# Patient Record
Sex: FEMALE | Race: BLACK OR AFRICAN AMERICAN | URBAN - METROPOLITAN AREA
[De-identification: names, ages, dates, MRNs, and addresses within clinical notes are randomized per-mention and may not be internally consistent; named-entity substitution may affect disease eponyms.]

---

## 2018-02-20 ENCOUNTER — HISTORICAL (OUTPATIENT)
Dept: ADMINISTRATIVE | Facility: HOSPITAL | Age: 68
End: 2018-02-20

## 2020-12-12 ENCOUNTER — HISTORICAL (OUTPATIENT)
Dept: ADMINISTRATIVE | Facility: HOSPITAL | Age: 70
End: 2020-12-12

## 2020-12-12 LAB — SARS-COV-2 RNA RESP QL NAA+PROBE: NOT DETECTED

## 2022-04-10 ENCOUNTER — HISTORICAL (OUTPATIENT)
Dept: ADMINISTRATIVE | Facility: HOSPITAL | Age: 72
End: 2022-04-10

## 2022-04-11 ENCOUNTER — HISTORICAL (OUTPATIENT)
Dept: ADMINISTRATIVE | Facility: HOSPITAL | Age: 72
End: 2022-04-11

## 2022-04-28 VITALS
WEIGHT: 142 LBS | DIASTOLIC BLOOD PRESSURE: 68 MMHG | HEIGHT: 61 IN | BODY MASS INDEX: 26.81 KG/M2 | SYSTOLIC BLOOD PRESSURE: 124 MMHG

## 2022-04-28 VITALS
DIASTOLIC BLOOD PRESSURE: 68 MMHG | BODY MASS INDEX: 26.81 KG/M2 | WEIGHT: 142 LBS | SYSTOLIC BLOOD PRESSURE: 124 MMHG | HEIGHT: 61 IN

## 2024-08-28 DIAGNOSIS — M54.42 ACUTE BACK PAIN WITH SCIATICA, LEFT: Primary | ICD-10-CM

## 2024-08-28 DIAGNOSIS — M54.41 ACUTE BACK PAIN WITH SCIATICA, RIGHT: ICD-10-CM

## 2024-09-17 ENCOUNTER — OFFICE VISIT (OUTPATIENT)
Facility: CLINIC | Age: 74
End: 2024-09-17
Payer: MEDICARE

## 2024-09-17 VITALS
DIASTOLIC BLOOD PRESSURE: 75 MMHG | WEIGHT: 142 LBS | BODY MASS INDEX: 26.81 KG/M2 | SYSTOLIC BLOOD PRESSURE: 145 MMHG | HEART RATE: 81 BPM | HEIGHT: 61 IN

## 2024-09-17 DIAGNOSIS — M54.42 BILATERAL LOW BACK PAIN WITH BILATERAL SCIATICA, UNSPECIFIED CHRONICITY: Primary | ICD-10-CM

## 2024-09-17 DIAGNOSIS — M47.816 LUMBAR SPONDYLOSIS: ICD-10-CM

## 2024-09-17 DIAGNOSIS — M54.42 ACUTE BACK PAIN WITH SCIATICA, LEFT: ICD-10-CM

## 2024-09-17 DIAGNOSIS — M54.41 BILATERAL LOW BACK PAIN WITH BILATERAL SCIATICA, UNSPECIFIED CHRONICITY: Primary | ICD-10-CM

## 2024-09-17 DIAGNOSIS — M54.41 ACUTE BACK PAIN WITH SCIATICA, RIGHT: ICD-10-CM

## 2024-09-17 PROCEDURE — 3077F SYST BP >= 140 MM HG: CPT | Mod: CPTII,,, | Performed by: NURSE PRACTITIONER

## 2024-09-17 PROCEDURE — 1159F MED LIST DOCD IN RCRD: CPT | Mod: CPTII,,, | Performed by: NURSE PRACTITIONER

## 2024-09-17 PROCEDURE — 99204 OFFICE O/P NEW MOD 45 MIN: CPT | Mod: 25,,, | Performed by: NURSE PRACTITIONER

## 2024-09-17 PROCEDURE — 1160F RVW MEDS BY RX/DR IN RCRD: CPT | Mod: CPTII,,, | Performed by: NURSE PRACTITIONER

## 2024-09-17 PROCEDURE — 3078F DIAST BP <80 MM HG: CPT | Mod: CPTII,,, | Performed by: NURSE PRACTITIONER

## 2024-09-17 PROCEDURE — 1101F PT FALLS ASSESS-DOCD LE1/YR: CPT | Mod: CPTII,,, | Performed by: NURSE PRACTITIONER

## 2024-09-17 PROCEDURE — 3008F BODY MASS INDEX DOCD: CPT | Mod: CPTII,,, | Performed by: NURSE PRACTITIONER

## 2024-09-17 PROCEDURE — 96372 THER/PROPH/DIAG INJ SC/IM: CPT | Mod: ,,, | Performed by: NURSE PRACTITIONER

## 2024-09-17 PROCEDURE — 1125F AMNT PAIN NOTED PAIN PRSNT: CPT | Mod: CPTII,,, | Performed by: NURSE PRACTITIONER

## 2024-09-17 PROCEDURE — 3288F FALL RISK ASSESSMENT DOCD: CPT | Mod: CPTII,,, | Performed by: NURSE PRACTITIONER

## 2024-09-17 RX ORDER — FOLIC ACID 1 MG/1
1000 TABLET ORAL
COMMUNITY
Start: 2024-09-16

## 2024-09-17 RX ORDER — FLUTICASONE PROPIONATE AND SALMETEROL 100; 50 UG/1; UG/1
1 POWDER RESPIRATORY (INHALATION)
COMMUNITY
Start: 2024-04-09 | End: 2024-10-06

## 2024-09-17 RX ORDER — ALENDRONATE SODIUM 70 MG/1
70 TABLET ORAL
COMMUNITY

## 2024-09-17 RX ORDER — BUSPIRONE HYDROCHLORIDE 5 MG/1
5 TABLET ORAL
COMMUNITY
Start: 2024-01-22

## 2024-09-17 RX ORDER — CALCIUM CARBONATE/VITAMIN D3 600MG-5MCG
TABLET ORAL
COMMUNITY

## 2024-09-17 RX ORDER — BIMATOPROST 0.1 MG/ML
SOLUTION/ DROPS OPHTHALMIC
COMMUNITY
Start: 2024-08-13

## 2024-09-17 RX ORDER — SIMVASTATIN 40 MG/1
1 TABLET, FILM COATED ORAL NIGHTLY
COMMUNITY
Start: 2024-07-15

## 2024-09-17 RX ORDER — LETROZOLE 2.5 MG/1
2.5 TABLET, FILM COATED ORAL
COMMUNITY

## 2024-09-17 RX ORDER — ALBUTEROL SULFATE 90 UG/1
2 INHALANT RESPIRATORY (INHALATION)
COMMUNITY
Start: 2023-11-29

## 2024-09-17 RX ORDER — METHOTREXATE 2.5 MG/1
10 TABLET ORAL
COMMUNITY
Start: 2024-09-16

## 2024-09-17 RX ORDER — TIZANIDINE 4 MG/1
4 TABLET ORAL NIGHTLY
COMMUNITY
Start: 2024-09-16

## 2024-09-17 RX ORDER — CELECOXIB 200 MG/1
200 CAPSULE ORAL
COMMUNITY
Start: 2024-09-16

## 2024-09-17 RX ORDER — NAPROXEN SODIUM 220 MG/1
1 TABLET, FILM COATED ORAL EVERY MORNING
COMMUNITY

## 2024-09-17 RX ORDER — FLUTICASONE PROPIONATE 50 MCG
1 SPRAY, SUSPENSION (ML) NASAL DAILY PRN
COMMUNITY

## 2024-09-17 RX ORDER — OMEPRAZOLE 40 MG/1
40 CAPSULE, DELAYED RELEASE ORAL
COMMUNITY
Start: 2024-09-16

## 2024-09-17 RX ORDER — IBUPROFEN 100 MG/5ML
1 SUSPENSION, ORAL (FINAL DOSE FORM) ORAL EVERY MORNING
COMMUNITY

## 2024-09-17 RX ORDER — MONTELUKAST SODIUM 10 MG/1
1 TABLET ORAL NIGHTLY
COMMUNITY
Start: 2024-06-27 | End: 2025-06-27

## 2024-09-17 RX ORDER — METHYLPREDNISOLONE ACETATE 80 MG/ML
40 INJECTION, SUSPENSION INTRA-ARTICULAR; INTRALESIONAL; INTRAMUSCULAR; SOFT TISSUE
Status: COMPLETED | OUTPATIENT
Start: 2024-09-17 | End: 2024-09-17

## 2024-09-17 RX ADMIN — METHYLPREDNISOLONE ACETATE 40 MG: 80 INJECTION, SUSPENSION INTRA-ARTICULAR; INTRALESIONAL; INTRAMUSCULAR; SOFT TISSUE at 09:09

## 2024-09-17 NOTE — PROGRESS NOTES
[facility]    Subjective:     Chief Complaint: No chief complaint on file.    Referred by: Daniel Lazcano*     History of Present Illness: Corrine French is a 73 y.o. female presents today as a new consult for bilateral low back pain with bilateral sciatica.  Patient's pain started April of this year after twisting her back however it has been ongoing for years spontaneously.  Her primary pain is located to the lumbar axial spine along with bilateral hips and will radiate in a posterior lateral pattern down bilateral legs.  She reports her leg pain is her primary issue and back pain as a secondary.  She completed 6 weeks of physical therapy both in the gym in the pull that was ineffective for her pain.  In 2008 she received 1 lumbar epidural steroid injection that gave her excellent pain relief.  Her pain will elevate to a 10/10 on the NRS especially at night she has a lot of difficulty turning in bed with severe pain as well as egress sitting out of bed to go to the bathroom at night.  Nighttime is her worst time for pain. Her pain will reduce when she starts moving around and walking.  This will reduced to a 5/10 on the NRS.  In the past she has tried gabapentin for 3 months as well as Mobic that was ineffective.  She is currently taking Celebrex and tizanidine.  She is also in the process of establishing care with the endocrinologist who will be managing her methotrexate.  She has a history breast cancer that was in Situ with a lumpectomy.  She describes her pain to the legs as feeling like a crushing and like her bones are being broken type pain.  In the past she had numbness she no longer has that.  She has not completed a NCV of her bilateral lower extremities.  In his interested in doing so.  Review of her MRI lumbar spine has minimal changes at L5-S1 in her MRI lumbar spine which was completed in 2023.  She is agreeable to complete a bilateral lower extremity nerve conduction  velocity to better define the etiology her sciatica to bilateral posterior legs stopping at her knees.    Pertinent PMH rheumatoid arthritis with new methotrexate, osteoporosis without fractures  Pertinent PSH: Negative spine surgeries, negative pacemaker/defibrillator/spinal cord stimulator        Vital signs:   There were no vitals taken for this visit. There were no vitals filed for this visit.          Interventional Pain History  Prior lumbar ESIs in 2008 (very effective)     ROS:  Back and bilateral leg pain    MRI lumbar spine 2023  (located in care everywhere)   Impression    1. Disc space narrowing, mild annular bulging L1-L2.  2. Annular bulging L3-L4.  3. Disc space narrowing with uncovering of the disc and borderline canal stenosis L4-L5. Alignment deformity is present at this level.  4. Multilevel posterior element hypertrophy.  Narrative    HISTORY/TECHNIQUE: Multiplanar, multiecho scanning of the lumbar spine is completed. No contrast was administered. No prior scans are available. History of low back pain and suspected spondyloarthropathy.    FINDINGS: Conus medullaris tapers at the T12-L1 level. Multilevel lumbar disc desiccation is demonstrated. Lumbar vertebral body stature is maintained. There is alignment deformity at the L4-5 level with anterolisthesis of L4 upon L5 with pars interarticularis defects of L4 suspected.    L1-L2: Disc space narrowing. Schmorl's nodes are identified at the contiguous endplates at this level. Mild annular bulging. Spinal canal and neural foramina are patent.    L2-L3: Spinal canal and neural foramina are patent. Mild annular bulging. There is posterior element hypertrophy.    L3-L4: Annular bulging. Spinal canal and neural foramen are patent. There is posterior element hypertrophy.    L4-L5: Disc space narrowing. Uncovering of the disc at this level. There is effacement of the spinal canal with borderline spinal canal stenosis. There is effacement of the right  greater than left subarticular recess. Neural foramina are patent.    L5-S1: Spinal canal and left neural foramen appear patent. There is effacement of the right subarticular recess and right neural foramen secondary to posterior element hypertrophy asymmetric toward the right.  Procedure Note    Darrius Francis MD - 04/18/2023  Formatting of this note might be different from the original.  HISTORY/TECHNIQUE: Multiplanar, multiecho scanning of the lumbar spine is completed. No contrast was administered. No prior scans are available. History of low back pain and suspected spondyloarthropathy.    FINDINGS: Conus medullaris tapers at the T12-L1 level. Multilevel lumbar disc desiccation is demonstrated. Lumbar vertebral body stature is maintained. There is alignment deformity at the L4-5 level with anterolisthesis of L4 upon L5 with pars interarticularis defects of L4 suspected.    L1-L2: Disc space narrowing. Schmorl's nodes are identified at the contiguous endplates at this level. Mild annular bulging. Spinal canal and neural foramina are patent.    L2-L3: Spinal canal and neural foramina are patent. Mild annular bulging. There is posterior element hypertrophy.    L3-L4: Annular bulging. Spinal canal and neural foramen are patent. There is posterior element hypertrophy.    L4-L5: Disc space narrowing. Uncovering of the disc at this level. There is effacement of the spinal canal with borderline spinal canal stenosis. There is effacement of the right greater than left subarticular recess. Neural foramina are patent.    L5-S1: Spinal canal and left neural foramen appear patent. There is effacement of the right subarticular recess and right neural foramen secondary to posterior element hypertrophy asymmetric toward the right.    IMPRESSION:  1. Disc space narrowing, mild annular bulging L1-L2.  2. Annular bulging L3-L4.  3. Disc space narrowing with uncovering of the disc and borderline canal stenosis L4-L5. Alignment  deformity is present at this level.  4. Multilevel posterior element hypertrophy.       Objective:        Physical Exam  General: Well developed;gt A&O x 3; No anxiety/depression; NAD  Mental Status: Oriented to person, palce and time. Displays appropriate mood & affect.  Head: Norm cephalic and atraumatic  Neck:  No cervical paraspinal banding.  Full range of motion with lateral turning and cervical flexion +extension.  Eyes: normal conjunctiva, normal lids, normal pupils  ENT and mouth: normal external ear, nose, and no lesions noted on the lips.  Respiratory: Symmetrical, Unlabored. No dyspnea  CV: normal rhythm and rate. No peripheral edema.   Abdomen: Non-distended    Extremities:  Gen: No cyanosis or tenderness to palpation bilateral upper and lower extremities  Skin: Warm, pink, dry, no rashes, no lesions on the lumbar spine  Strength: 5/5 motor strength bilateral upper and lower extremities  ROM: Full ROM in bilateral knees and ankles without pain or instability.    Neuro:  Gait: no altalgic lean, normal toe and heel raise. Independent ambulator.  DTR's: 2+ in bilateral patellar, and ankle  Sensory: Intact to light touch bilateral  upper and lower extremities    Spine: Normal lordosis. No scoliosis  L-spine ROM:  Limited and slight pain with ROM to flexion, extension, bilateral rotation,   Straight Leg Raise:  Positive right, positive left  SI Joint: No tenderness to palpation bilaterally.      Assessment:     Corrine French is a 73 y.o. female presents today as a new consult for bilateral low back pain with bilateral sciatica.  Patient's pain started April of this year after twisting her back however it has been ongoing for years spontaneously.  Her primary pain is located to the lumbar axial spine along with bilateral hips and will radiate in a posterior lateral pattern down bilateral legs.  She reports her leg pain is her primary issue and back pain as a secondary.  She completed 6 weeks of physical  therapy both in the gym in the pull that was ineffective for her pain.  In 2008 she received 1 lumbar epidural steroid injection that gave her excellent pain relief.  Her pain will elevate to a 10/10 on the NRS especially at night she has a lot of difficulty turning in bed with severe pain as well as egress sitting out of bed to go to the bathroom at night.  Nighttime is her worst time for pain. Her pain will reduce when she starts moving around and walking.  This will reduced to a 5/10 on the NRS.  In the past she has tried gabapentin for 3 months as well as Mobic that was ineffective.  She is currently taking Celebrex and tizanidine.  She is also in the process of establishing care with the endocrinologist who will be managing her methotrexate.  She has a history breast cancer that was in Situ with a lumpectomy.  She describes her pain to the legs as feeling like a crushing and like her bones are being broken type pain.  In the past she had numbness she no longer has that.  She has not completed a NCV of her bilateral lower extremities.  In his interested in doing so.  Review of her MRI lumbar spine has minimal changes at L5-S1 in her MRI lumbar spine which was completed in 2023.  She is agreeable to complete a bilateral lower extremity nerve conduction velocity to better define the etiology her sciatica to bilateral posterior legs stopping at her knees.      Plan:   NCV bilateral lower ext  Follow up in 3 weeks for results  40 mg depomedrol IM    Encounter Diagnosis   Name Primary?    Bilateral low back pain with bilateral sciatica, unspecified chronicity Yes         Plan:       Diagnoses and all orders for this visit:    Bilateral low back pain with bilateral sciatica, unspecified chronicity           No past medical history on file.    No past surgical history on file.    No family history on file.    Social History     Socioeconomic History    Marital status:        No current outpatient medications on  file.     No current facility-administered medications for this visit.       Review of patient's allergies indicates:  Not on File

## 2024-10-10 ENCOUNTER — OFFICE VISIT (OUTPATIENT)
Facility: CLINIC | Age: 74
End: 2024-10-10
Payer: MEDICARE

## 2024-10-10 VITALS
TEMPERATURE: 98 F | HEART RATE: 81 BPM | DIASTOLIC BLOOD PRESSURE: 74 MMHG | SYSTOLIC BLOOD PRESSURE: 133 MMHG | BODY MASS INDEX: 26.81 KG/M2 | HEIGHT: 61 IN | WEIGHT: 142 LBS

## 2024-10-10 DIAGNOSIS — M54.16 LUMBAR RADICULOPATHY: ICD-10-CM

## 2024-10-10 DIAGNOSIS — G89.29 CHRONIC BILATERAL LOW BACK PAIN WITH BILATERAL SCIATICA: Primary | ICD-10-CM

## 2024-10-10 DIAGNOSIS — M54.42 CHRONIC BILATERAL LOW BACK PAIN WITH BILATERAL SCIATICA: Primary | ICD-10-CM

## 2024-10-10 DIAGNOSIS — M51.369 DEGENERATION OF INTERVERTEBRAL DISC OF LUMBAR REGION, UNSPECIFIED WHETHER PAIN PRESENT: ICD-10-CM

## 2024-10-10 DIAGNOSIS — M47.816 LUMBAR SPONDYLOSIS: ICD-10-CM

## 2024-10-10 DIAGNOSIS — M54.41 CHRONIC BILATERAL LOW BACK PAIN WITH BILATERAL SCIATICA: Primary | ICD-10-CM

## 2024-10-10 NOTE — PROGRESS NOTES
"  [facility]    Subjective:     Chief Complaint: Low-back Pain (3 week f/u EMG results today, pt states she has been having difficult time sleeping due to bilateral leg pain, RX medication for relief, pain level 8/10 //Depo medrol did not provide relief )    Referred by: No ref. provider found     History of Present Illness: Corrine French is a 73 y.o. female presents today as a three-week follow-up after completing bilateral EMG/NCV to go over results and plan of care.  Results showed chronic bilateral L3 +L4 radiculopathy without denervation.  Patient continues to have pain to bilateral low back, buttocks and bilateral hips as well as pain down bilateral posterior thighs.  She completed 6 weeks of physical therapy with exacerbated pain as a result.  She currently takes tizanidine as needed as well as Celebrex and she will start methotrexate soon.  Patient states her pain will elevate in these areas to a 7-8/10 on the NRS with cleaning, sweeping mopping and other household chores.  At night when she is turning and tossing in bed defined a non painful position this will elevate her pain score to a 10/10.  Additionally if she rides in a golf cart this will elevate her pain to a 10/10 as well.  She describes this as a sharp stabbing sensation.  She would like to proceed with a bilateral transforaminal epidural steroid injection to L3.        Vital signs:   Visit Vitals  /74   Pulse 81   Temp 98.4 °F (36.9 °C) (Oral)   Ht 5' 1" (1.549 m)   Wt 64.4 kg (141 lb 15.6 oz)   BMI 26.83 kg/m²      Vitals:    10/10/24 1510   PainSc:   8     Pain Disability Index (PDI): 43       Interventional Pain History  None    ROS: Low back and leg pain    EMG/NCV results 2024   (lower extremities):    Results showed chronic bilateral L3 +L4 radiculopathy without denervation.    MRI lumbar spine 2023:  Impression  1. Disc space narrowing, mild annular bulging L1-L2.  2. Annular bulging L3-L4.  3. Disc space narrowing " with uncovering of the disc and borderline canal stenosis L4-L5. Alignment deformity is present at this level.  4. Multilevel posterior element hypertrophy.    FINDINGS: Conus medullaris tapers at the T12-L1 level. Multilevel lumbar disc desiccation is demonstrated. Lumbar vertebral body stature is maintained. There is alignment deformity at the L4-5 level with anterolisthesis of L4 upon L5 with pars interarticularis defects of L4 suspected.    L1-L2: Disc space narrowing. Schmorl's nodes are identified at the contiguous endplates at this level. Mild annular bulging. Spinal canal and neural foramina are patent.    L2-L3: Spinal canal and neural foramina are patent. Mild annular bulging. There is posterior element hypertrophy.    L3-L4: Annular bulging. Spinal canal and neural foramen are patent. There is posterior element hypertrophy.    L4-L5: Disc space narrowing. Uncovering of the disc at this level. There is effacement of the spinal canal with borderline spinal canal stenosis. There is effacement of the right greater than left subarticular recess. Neural foramina are patent.    L5-S1: Spinal canal and left neural foramen appear patent. There is effacement of the right subarticular recess and right neural foramen secondary to posterior element hypertrophy asymmetric toward the right.  Exam End: 04/18/23 09:00 Last Resulted: 04/18/23 09:22   [facility]  Result Received: 09/05/24 14:19      Narrative            Objective:        Physical Exam  General: Well developed; normal weight A&O x 3; No anxiety/depression; NAD  Mental Status: Oriented to person, palce and time. Displays appropriate mood & affect.  Head: Norm cephalic and atraumatic  Neck:  No cervical paraspinal banding.  Full range of motion with lateral turning and cervical flexion +extension.  Eyes: normal conjunctiva, normal lids, normal pupils  ENT and mouth:  normal external ear, nose, and no lesions noted on the lips.  Respiratory: Symmetrical, Unlabored. No dyspnea  CV: normal rhythm and rate. No peripheral edema.   Abdomen: Non-distended    Extremities:  Gen: No cyanosis or tenderness to palpation bilateral upper and lower extremities  Skin: Warm, pink, dry, no rashes, no lesions on the lumbar spine  Strength: 5/5 motor strength bilateral upper and lower extremities  ROM: Full ROM in bilateral knees and ankles without pain or instability.    Neuro:  Gait: no altalgic lean, normal toe and heel raise. Independent ambulator.  DTR's: 2+ in bilateral patellar, and ankle  Sensory: Intact to light touch bilateral  upper and lower extremities    Spine: Normal lordosis. No scoliosis  L-spine ROM:  Limited and painful ROM to flexion, extension, bilateral rotation,   Straight Leg Raise:  Positive right, positive left  SI Joint: No tenderness to palpation bilaterally.      Assessment:     Corrine French is a 73 y.o. female presents today as a three-week follow-up after completing bilateral EMG/NCV to go over results and plan of care.  Results showed chronic bilateral L3 +L4 radiculopathy without denervation.  Patient continues to have pain to bilateral low back, buttocks and bilateral hips as well as pain down bilateral posterior thighs.  She completed 6 weeks of physical therapy with exacerbated pain as a result.  She currently takes tizanidine as needed as well as Celebrex and she will start methotrexate soon.  Patient states her pain will elevate in these areas to a 7-8/10 on the NRS with cleaning, sweeping mopping and other household chores.  At night when she is turning and tossing in bed defined a non painful position this will elevate her pain score to a 10/10.  Additionally if she rides in a golf cart this will elevate her pain to a 10/10 as well.  She describes this as a sharp stabbing sensation.  She would like to proceed with a bilateral transforaminal epidural  "steroid injection to L3.    Plan of Care:   Request sent for bilateral [id number]  Patient to hold Methotrexate 7 days prior to procedure.   Encounter Diagnoses   Name Primary?    Chronic bilateral low back pain with bilateral sciatica Yes    Lumbar radiculopathy          Plan:       Corrine Mohr" was seen today for low-back pain.    Diagnoses and all orders for this visit:    Chronic bilateral low back pain with bilateral sciatica    Lumbar radiculopathy           Past Medical History:   Diagnosis Date    Allergy     Breast cancer in female     Rt breast    Hyperlipidemia        Past Surgical History:   Procedure Laterality Date    CHOLECYSTECTOMY      ENDOSCOPIC RELEASE OF BOTH CARPAL TUNNELS Bilateral     HEMORRHOID SURGERY N/A     HYSTERECTOMY      LUMPECTOMY, BREAST Right        No family history on file.    Social History     Socioeconomic History    Marital status:    Tobacco Use    Smoking status: Never    Smokeless tobacco: Never   Substance and Sexual Activity    Alcohol use: Yes    Drug use: Never       Current Outpatient Medications   Medication Sig Dispense Refill    albuterol (PROVENTIL/VENTOLIN HFA) 90 mcg/actuation inhaler Inhale 2 puffs into the lungs.      alendronate (FOSAMAX) 70 MG tablet Take 70 mg by mouth every 7 days.      ascorbic acid, vitamin C, (VITAMIN C) 1000 MG tablet Take 1 tablet by mouth every morning.      aspirin 81 MG Chew Take 1 tablet by mouth every morning.      busPIRone (BUSPAR) 5 MG Tab Take 5 mg by mouth.      calcium-vitamin D tablet 600 mg-200 units 1 tablet with a meal Orally twice a day      celecoxib (CELEBREX) 200 MG capsule Take 200 mg by mouth.      fluticasone propionate (FLONASE) 50 mcg/actuation nasal spray 1 spray by Nasal route daily as needed.      folic acid (FOLVITE) 1 MG tablet Take 1,000 mcg by mouth.      letrozole (FEMARA) 2.5 mg Tab Take 2.5 mg by mouth.      LUMIGAN 0.01 % Drop Place into both eyes.      methotrexate 2.5 MG Tab Take 10 mg by mouth " every 7 days.      montelukast (SINGULAIR) 10 mg tablet Take 1 tablet by mouth every evening.      omeprazole (PRILOSEC) 40 MG capsule Take 40 mg by mouth.      simvastatin (ZOCOR) 40 MG tablet Take 1 tablet by mouth every evening.      tiZANidine (ZANAFLEX) 4 MG tablet Take 4 mg by mouth every evening.      fluticasone-salmeterol diskus inhaler 100-50 mcg Inhale 1 puff into the lungs.       No current facility-administered medications for this visit.       Review of patient's allergies indicates:   Allergen Reactions    Codeine Itching, Rash and Other (See Comments)     Other Reaction(s): C/O: itching    Hydrocodone Itching and Rash

## 2024-10-31 ENCOUNTER — OFFICE VISIT (OUTPATIENT)
Facility: CLINIC | Age: 74
End: 2024-10-31
Payer: MEDICARE

## 2024-10-31 VITALS
HEART RATE: 81 BPM | HEIGHT: 61 IN | DIASTOLIC BLOOD PRESSURE: 76 MMHG | WEIGHT: 141 LBS | SYSTOLIC BLOOD PRESSURE: 139 MMHG | BODY MASS INDEX: 26.62 KG/M2 | TEMPERATURE: 98 F

## 2024-10-31 DIAGNOSIS — M54.42 CHRONIC BILATERAL LOW BACK PAIN WITH BILATERAL SCIATICA: ICD-10-CM

## 2024-10-31 DIAGNOSIS — M51.362 DEGENERATION OF INTERVERTEBRAL DISC OF LUMBAR REGION WITH DISCOGENIC BACK PAIN AND LOWER EXTREMITY PAIN: ICD-10-CM

## 2024-10-31 DIAGNOSIS — G89.29 CHRONIC BILATERAL LOW BACK PAIN WITH BILATERAL SCIATICA: ICD-10-CM

## 2024-10-31 DIAGNOSIS — M54.41 CHRONIC BILATERAL LOW BACK PAIN WITH BILATERAL SCIATICA: ICD-10-CM

## 2024-10-31 DIAGNOSIS — M54.16 LUMBAR RADICULOPATHY: Primary | ICD-10-CM

## 2024-10-31 NOTE — H&P (VIEW-ONLY)
Pain Management Clinic  Pre-Operative Clinic Note      SUBJECTIVE    CHIEF COMPLAINT: Pre-op Exam (Pre op procedure 11/14/24 C/O pain level 5, Taking Celebrex & Tizanidine for pain, pain worse @ night states pain scale 10 +)       History of Present Illness: 73 y.o. female presents today for preoperative evaluation for bilateral L3 transforaminal epidural steroid injection on 11/14/2024.. I reviewed the indications for procedure. The risks and benefits of the proposed and alternative treatments were discussed with the patient. Questions pertinent to the procedure were solicited and answered. No assurances were given. Informed consent was obtained. The patient expressed good understanding and wished to proceed with scheduling the procedure.     Review of Systems:   Constitutional: No fever, weakness, or fatigue.   Ear/Nose/Mouth/Throat: No nasal congestion or sore throat.   Respiratory: No shortness of breath or cough.   Cardiovascular: No chest pain, palpitations, or peripheral edema.   Gastrointestinal: No nausea, vomiting, or abdominal pain.   Genitourinary: No dysuria.  Musculoskeletal: Patient continues to have pain to bilateral low back, buttocks and bilateral hips as well as pain down bilateral posterior thighs. She completed 6 weeks of physical therapy with exacerbated pain as a result. She currently takes tizanidine as needed as well as Celebrex and she will start methotrexate soon. Patient states her pain will elevate in these areas to a 7-8/10 on the NRS with cleaning, sweeping mopping and other household chores. At night when she is turning and tossing in bed defined a non painful position this will elevate her pain score to a 10/10. Additionally if she rides in a golf cart this will elevate her pain to a 10/10 as well. She describes this as a sharp stabbing sensation. She would like to proceed with a bilateral transforaminal epidural steroid injection to L3.     Past Surgical History:   Procedure  "Laterality Date    CHOLECYSTECTOMY      ENDOSCOPIC RELEASE OF BOTH CARPAL TUNNELS Bilateral     HEMORRHOID SURGERY N/A     HYSTERECTOMY      LUMPECTOMY, BREAST Right         Past Medical History:   Diagnosis Date    Allergy     Breast cancer in female     Rt breast    Hyperlipidemia         OBJECTIVE:    Visit Vitals  /76 (BP Location: Right arm, Patient Position: Sitting)   Pulse 81   Temp 98.1 °F (36.7 °C)   Ht 5' 1" (1.549 m)   Wt 64 kg (141 lb)   BMI 26.64 kg/m²     Vitals:    10/31/24 1102   PainSc:   5       Physical Exam:   General: Well-developed, well-nourished.  Neuro: Alert and oriented x 3.  Psych: Normal mood and affect.  HEENT: Normocephalic. PERRLA EOM intact. Nose and throat clear.  Lungs: Clear to auscultation and percussion.  Heart: Regular rate and rhythm   Abdomen: Soft non-tender. Bowel sounds positive. No rebound tenderness.  Skin: No rashes or open wounds  Musculoskeletal:Neuro:  Gait: no altalgic lean, normal toe and heel raise. Independent ambulator.  DTR's: 2+ in bilateral patellar, and ankle  Sensory: Intact to light touch bilateral  upper and lower extremities     Spine: Normal lordosis. No scoliosis  L-spine ROM:  Limited and painful ROM to flexion, extension, bilateral rotation,   Straight Leg Raise:  Positive right, positive left  SI Joint: No tenderness to palpation bilaterally.       ASSESSMENT:  1. Lumbar radiculopathy    2. Degeneration of intervertebral disc of lumbar region with discogenic back pain and lower extremity pain    3. Chronic bilateral low back pain with bilateral sciatica       PLAN:  Plan for to proceed with bilateral L3 transforaminal epidural steroid injection on 11/14/2024.  [x] The patient has been given preoperative instructions and prescriptions for post-operative medication.   [x] Medications to hold:  Methotrexate +Celebrex for 1 week prior to procedure and resume postop.  [] Cardiac clearance received and reviewed.  [x] Post-operative appointment is " scheduled for 2 weeks.

## 2024-11-13 NOTE — DISCHARGE INSTRUCTIONS
EPIDURAL STEROID INJECTION, CARE AFTER      NO HEAVY LIFTING FOR ONE WEEK  NO HEAT FOR 24 HOURS  APPLY ICE PACK FOR COMFORT TO SITES  REMOVE BAND-AIDS TOMORROW AND THEN YOU MAY SHOWER  NO TUB SOAKING FOR 3-5 DAYS  No aspirin, blood thinners, or NSAIDs for 24 hours.    These instructions give you information on caring for yourself after your procedure. Your doctor may also give you specific instructions. Call your doctor if you have any problems or questions after your procedure.     HOME CARE  Do not drive or use any machinery for the next 24 hours.  Do not do any hard physical activity for the next 24 hours  Do not use a heating pad in area of injection  You may go back to eating as usual    SIDE EFFECTS THAT COULD HAPPEN UP TO 4 HOURS AFTER THE INJECTION  If you have leg weakness or numbness, have someone help you walk. If the weakness or numbness does not go away, or if it gets worse go to the emergency department.  If you have dizziness, lie down right away. This usually helps. Sit up slowly and then when you have been sitting for a few minutes then stand up.  If you have a mild headache, drink fluids (especially drinks with caffeine) Call your doctor if the headache gets worse or persists.  When the numbing medicine wears off you may feel some discomfort where you had the shot. It usually only lasts for a few days. You may put ice over the injection site. Leave ice on for 20 minutes at a time and protect your skin during use.   You may feel minor back pain and stiffness at the site of the shot. Call your doctor if this pain gets worse or does not improve. You may feel nauseous or vomit several hours after your procedure. If this happens, try drinking small amounts of clear liquids until you feel better. If you continue to feel nauseated or continue vomiting, get help right away.    Steroids may take several days to start working. The shot usually helps leg pain more than back pain. The shot will not fix what is  causing the pain but may take away some of the pain. This pain relief may last from 2 weeks to 6 months.     GET HELP RIGHT AWAY IF:  You have very bad pain, headache or neck pain or stiffness  There is a change in your vision (double or blurry vision)  You have a fever over 101 or chills  You have swelling or redness at the injection site  You get weaker  You are not able to control your bladder or bowels  You are not able to urinate

## 2024-11-14 ENCOUNTER — ANESTHESIA EVENT (OUTPATIENT)
Facility: HOSPITAL | Age: 74
End: 2024-11-14
Payer: MEDICARE

## 2024-11-14 ENCOUNTER — HOSPITAL ENCOUNTER (OUTPATIENT)
Facility: HOSPITAL | Age: 74
Discharge: HOME | End: 2024-11-14
Attending: ANESTHESIOLOGY | Admitting: ANESTHESIOLOGY
Payer: MEDICARE

## 2024-11-14 ENCOUNTER — ANESTHESIA (OUTPATIENT)
Facility: HOSPITAL | Age: 74
End: 2024-11-14
Payer: MEDICARE

## 2024-11-14 DIAGNOSIS — G89.29 CHRONIC BACK PAIN GREATER THAN 3 MONTHS DURATION: Primary | ICD-10-CM

## 2024-11-14 DIAGNOSIS — M54.9 CHRONIC BACK PAIN GREATER THAN 3 MONTHS DURATION: Primary | ICD-10-CM

## 2024-11-14 PROCEDURE — 64483 NJX AA&/STRD TFRM EPI L/S 1: CPT | Mod: 50 | Performed by: ANESTHESIOLOGY

## 2024-11-14 PROCEDURE — D9220A PRA ANESTHESIA: Mod: ,,, | Performed by: NURSE ANESTHETIST, CERTIFIED REGISTERED

## 2024-11-14 PROCEDURE — 25500020 PHARM REV CODE 255: Performed by: ANESTHESIOLOGY

## 2024-11-14 PROCEDURE — 37000008 HC ANESTHESIA 1ST 15 MINUTES: Performed by: ANESTHESIOLOGY

## 2024-11-14 PROCEDURE — 63600175 PHARM REV CODE 636 W HCPCS: Mod: JZ | Performed by: ANESTHESIOLOGY

## 2024-11-14 PROCEDURE — 64483 NJX AA&/STRD TFRM EPI L/S 1: CPT | Mod: 50,,, | Performed by: ANESTHESIOLOGY

## 2024-11-14 PROCEDURE — 63600175 PHARM REV CODE 636 W HCPCS: Performed by: NURSE ANESTHETIST, CERTIFIED REGISTERED

## 2024-11-14 RX ORDER — DIPHENHYDRAMINE HYDROCHLORIDE 50 MG/ML
25 INJECTION INTRAMUSCULAR; INTRAVENOUS EVERY 6 HOURS PRN
OUTPATIENT
Start: 2024-11-14

## 2024-11-14 RX ORDER — ACETAMINOPHEN 10 MG/ML
1000 INJECTION, SOLUTION INTRAVENOUS ONCE
OUTPATIENT
Start: 2024-11-14 | End: 2024-11-14

## 2024-11-14 RX ORDER — LIDOCAINE HYDROCHLORIDE 10 MG/ML
INJECTION, SOLUTION EPIDURAL; INFILTRATION; INTRACAUDAL; PERINEURAL
Status: DISCONTINUED | OUTPATIENT
Start: 2024-11-14 | End: 2024-11-14

## 2024-11-14 RX ORDER — GLUCAGON 1 MG
1 KIT INJECTION
OUTPATIENT
Start: 2024-11-14

## 2024-11-14 RX ORDER — PROPOFOL 10 MG/ML
VIAL (ML) INTRAVENOUS
Status: DISCONTINUED | OUTPATIENT
Start: 2024-11-14 | End: 2024-11-14

## 2024-11-14 RX ORDER — LIDOCAINE HYDROCHLORIDE 10 MG/ML
INJECTION, SOLUTION EPIDURAL; INFILTRATION; INTRACAUDAL; PERINEURAL
Status: DISCONTINUED | OUTPATIENT
Start: 2024-11-14 | End: 2024-11-14 | Stop reason: HOSPADM

## 2024-11-14 RX ORDER — BUPIVACAINE HYDROCHLORIDE 2.5 MG/ML
INJECTION, SOLUTION EPIDURAL; INFILTRATION; INTRACAUDAL
Status: DISCONTINUED | OUTPATIENT
Start: 2024-11-14 | End: 2024-11-14 | Stop reason: HOSPADM

## 2024-11-14 RX ORDER — SODIUM CHLORIDE, SODIUM GLUCONATE, SODIUM ACETATE, POTASSIUM CHLORIDE AND MAGNESIUM CHLORIDE 30; 37; 368; 526; 502 MG/100ML; MG/100ML; MG/100ML; MG/100ML; MG/100ML
INJECTION, SOLUTION INTRAVENOUS CONTINUOUS
OUTPATIENT
Start: 2024-11-14 | End: 2024-12-14

## 2024-11-14 RX ORDER — IOPAMIDOL 408 MG/ML
INJECTION, SOLUTION INTRATHECAL
Status: DISCONTINUED | OUTPATIENT
Start: 2024-11-14 | End: 2024-11-14 | Stop reason: HOSPADM

## 2024-11-14 RX ORDER — ONDANSETRON HYDROCHLORIDE 2 MG/ML
4 INJECTION, SOLUTION INTRAVENOUS DAILY PRN
OUTPATIENT
Start: 2024-11-14

## 2024-11-14 RX ORDER — LIDOCAINE HYDROCHLORIDE 10 MG/ML
1 INJECTION, SOLUTION EPIDURAL; INFILTRATION; INTRACAUDAL; PERINEURAL ONCE
OUTPATIENT
Start: 2024-11-14 | End: 2024-11-14

## 2024-11-14 RX ORDER — DEXAMETHASONE SODIUM PHOSPHATE 10 MG/ML
INJECTION INTRAMUSCULAR; INTRAVENOUS
Status: DISCONTINUED | OUTPATIENT
Start: 2024-11-14 | End: 2024-11-14 | Stop reason: HOSPADM

## 2024-11-14 RX ADMIN — PROPOFOL 50 MG: 10 INJECTION, EMULSION INTRAVENOUS at 09:11

## 2024-11-14 RX ADMIN — LIDOCAINE HYDROCHLORIDE 50 MG: 10 INJECTION, SOLUTION EPIDURAL; INFILTRATION; INTRACAUDAL; PERINEURAL at 09:11

## 2024-11-14 NOTE — DISCHARGE SUMMARY
[facility] Surgical - Periop Services 2nd Floor  Discharge Note  Short Stay    Procedure(s) (LRB):  INJECTION, STEROID, EPIDURAL, TRANSFORAMINAL APPROACH // Bilateral L3 (Bilateral)      OUTCOME: Patient tolerated treatment/procedure well without complication and is now ready for discharge.    DISPOSITION: Home or Self Care    FINAL DIAGNOSIS:  <principal problem not specified>    FOLLOWUP: In clinic    DISCHARGE INSTRUCTIONS:  No discharge procedures on file.     TIME SPENT ON DISCHARGE: 5 minutes

## 2024-11-14 NOTE — ANESTHESIA POSTPROCEDURE EVALUATION
Anesthesia Post Evaluation    Patient: Corrine French    Procedure(s) Performed: Procedure(s) (LRB):  INJECTION, STEROID, EPIDURAL, TRANSFORAMINAL APPROACH // Bilateral L3 (Bilateral)    Final Anesthesia Type: general      Patient location during evaluation: OPS  Patient participation: Yes- Able to Participate  Level of consciousness: awake and alert  Post-procedure vital signs: reviewed and stable  Pain management: adequate  Airway patency: patent    PONV status at discharge: No PONV  Anesthetic complications: no      Cardiovascular status: blood pressure returned to baseline  Respiratory status: unassisted  Hydration status: euvolemic  Follow-up not needed.              Vitals Value Taken Time   /89 11/14/24 0914   Temp  11/14/24 0914   Pulse 81 11/14/24 0914   Resp 16 11/14/24 0914   SpO2 100 % 11/14/24 0914         No case tracking events are documented in the log.      Pain/Marcelino Score: No data recorded

## 2024-11-14 NOTE — ANESTHESIA PREPROCEDURE EVALUATION
"                                                                                                             11/14/2024  Corrine French is a 73 y.o., female.    "History of Present Illness: 73 y.o. female presents today for preoperative evaluation for bilateral L3 transforaminal epidural steroid injection on 11/14/2024.. I reviewed the indications for procedure. The risks and benefits of the proposed and alternative treatments were discussed with the patient. Questions pertinent to the procedure were solicited and answered. No assurances were given. Informed consent was obtained. The patient expressed good understanding and wished to proceed with scheduling the procedure.      Review of Systems:   Constitutional: No fever, weakness, or fatigue.   Ear/Nose/Mouth/Throat: No nasal congestion or sore throat.   Respiratory: No shortness of breath or cough.   Cardiovascular: No chest pain, palpitations, or peripheral edema.   Gastrointestinal: No nausea, vomiting, or abdominal pain.   Genitourinary: No dysuria.  Musculoskeletal: Patient continues to have pain to bilateral low back, buttocks and bilateral hips as well as pain down bilateral posterior thighs. She completed 6 weeks of physical therapy with exacerbated pain as a result. She currently takes tizanidine as needed as well as Celebrex and she will start methotrexate soon. Patient states her pain will elevate in these areas to a 7-8/10 on the NRS with cleaning, sweeping mopping and other household chores. At night when she is turning and tossing in bed defined a non painful position this will elevate her pain score to a 10/10. Additionally if she rides in a golf cart this will elevate her pain to a 10/10 as well. She describes this as a sharp stabbing sensation. She would like to proceed with a bilateral transforaminal epidural steroid injection to L3.            Past Surgical History:   Procedure Laterality Date    CHOLECYSTECTOMY        ENDOSCOPIC RELEASE OF " "BOTH CARPAL TUNNELS Bilateral      HEMORRHOID SURGERY N/A      HYSTERECTOMY        LUMPECTOMY, BREAST Right                Past Medical History:   Diagnosis Date    Allergy      Breast cancer in female       Rt breast    Hyperlipidemia      ...    ASSESSMENT:  1. Lumbar radiculopathy     2. Degeneration of intervertebral disc of lumbar region with discogenic back pain and lower extremity pain     3. Chronic bilateral low back pain with bilateral sciatica        PLAN:  Plan for to proceed with bilateral L3 transforaminal epidural steroid injection on 11/14/2024."      Pre-op Assessment    I have reviewed the Patient Summary Reports.     I have reviewed the Nursing Notes. I have reviewed the NPO Status.   I have reviewed the Medications.     Review of Systems  Anesthesia Hx:             Denies Family Hx of Anesthesia complications.    Denies Personal Hx of Anesthesia complications.                    Hematology/Oncology:                        --  Cancer in past history:                     Cardiovascular:                hyperlipidemia                               Pulmonary:  Pulmonary Normal                       Musculoskeletal:  Arthritis               Neurological:    Neuromuscular Disease,             Chronic Pain Syndrome                             Physical Exam  General: Well nourished, Cooperative, Alert and Oriented    Airway:  Mallampati: II   Mouth Opening: Normal  TM Distance: Normal  Tongue: Normal  Neck ROM: Normal ROM    Dental:  Intact    Chest/Lungs:  Normal Respiratory Rate    Heart:  Rate: Normal  Rhythm: Regular Rhythm        Anesthesia Plan  Type of Anesthesia, risks & benefits discussed:    Anesthesia Type: Gen Natural Airway  Intra-op Monitoring Plan: Standard ASA Monitors  Post Op Pain Control Plan: multimodal analgesia  Induction:  IV  Informed Consent: Informed consent signed with the Patient and all parties understand the risks and agree with anesthesia plan.  All questions answered.   ASA " Score: 2  Day of Surgery Review of History & Physical: H&P Update referred to the surgeon/provider.    Ready For Surgery From Anesthesia Perspective.     .

## 2024-11-14 NOTE — OP NOTE
Procedure:    Left L3  transforaminal epidural steroid injection    Right L3  transforaminal epidural steroid injection    Pre-Procedure Diagnoses:  Chronic back pain greater than 3 months  Lumbar degenerative disc disease  Lumbar radiculopathy  Lumbar disc displacement    Post-Procedure Diagnoses:  Chronic back pain greater than 3 months  Lumbar degenerative disc disease  Lumbar radiculopathy  Lumbar disc displacement    Anesthesia:  Local and MAC    Estimated Blood Loss:  < 2 ML    Consent:  The procedure, risks, benefits, and alternatives were discussed with the patient.  The patient voiced understanding and fully informed written consent was obtained.    Description of the Procedure:  The patient was taken to the operating room and placed in the prone position. The skin overlying the lumbar spine was prepped with Chloraprep and draped in the usual sterile fashion.  An oblique fluoroscopic view was obtained on the left side at L3, with the superior articular process of the inferior vertebral body aligned with the pedicle.  Skin anesthesia was achieved using 2 mL of lidocaine 1%.  A 22-gauge 5 -inch Quinke spinal needle was inserted and advanced under intermittent fluoroscopic views into the epidural space. Proper needle position was confirmed under AP, oblique, and lateral fluoroscopic views.  Negative aspiration for blood or CSF was confirmed. 1 mL of contrast was injected, which revealed spread into the epidural space.  Then a combination of 5 mg of dexamethasone with 1 mL of 0.25% bupivacaine was easily injected.   There was no pain on injection. The needle was removed intact and bleeding was nil.  The same procedure was repeated in identical fashion on the right side at L3 .  Sterile bandages were applied. The patient was taken to the recovery room for further observation in stable condition. The patient was then discharged home with no complications.

## 2024-11-14 NOTE — TRANSFER OF CARE
"Anesthesia Transfer of Care Note    Patient: Corrine French    Procedure(s) Performed: Procedure(s) (LRB):  INJECTION, STEROID, EPIDURAL, TRANSFORAMINAL APPROACH // Bilateral L3 (Bilateral)    Patient location: OPS    Anesthesia Type: general    Transport from OR: Transported from OR on room air with adequate spontaneous ventilation    Post pain: adequate analgesia    Post assessment: no apparent anesthetic complications    Post vital signs: stable    Level of consciousness: sedated and awake    Nausea/Vomiting: no nausea/vomiting    Complications: none    Transfer of care protocol was followed      Last vitals: Visit Vitals  /89   Pulse 81   Temp 36.7 °C (98 °F) (Oral)   Resp 16   Ht 5' 2" (1.575 m)   Wt 70.3 kg (155 lb)   SpO2 100%   Breastfeeding No   BMI 28.35 kg/m²     "

## 2024-11-15 VITALS
HEIGHT: 62 IN | HEART RATE: 81 BPM | OXYGEN SATURATION: 100 % | TEMPERATURE: 98 F | RESPIRATION RATE: 16 BRPM | WEIGHT: 155 LBS | BODY MASS INDEX: 28.52 KG/M2 | SYSTOLIC BLOOD PRESSURE: 125 MMHG | DIASTOLIC BLOOD PRESSURE: 89 MMHG

## 2024-11-19 ENCOUNTER — PATIENT MESSAGE (OUTPATIENT)
Dept: RESEARCH | Facility: HOSPITAL | Age: 74
End: 2024-11-19
Payer: MEDICARE

## 2024-12-03 ENCOUNTER — OFFICE VISIT (OUTPATIENT)
Facility: CLINIC | Age: 74
End: 2024-12-03
Payer: MEDICARE

## 2024-12-03 VITALS
HEART RATE: 78 BPM | HEIGHT: 62 IN | WEIGHT: 155 LBS | BODY MASS INDEX: 28.52 KG/M2 | SYSTOLIC BLOOD PRESSURE: 136 MMHG | DIASTOLIC BLOOD PRESSURE: 57 MMHG | RESPIRATION RATE: 20 BRPM

## 2024-12-03 DIAGNOSIS — M54.41 CHRONIC BILATERAL LOW BACK PAIN WITH BILATERAL SCIATICA: Primary | ICD-10-CM

## 2024-12-03 DIAGNOSIS — M54.42 CHRONIC BILATERAL LOW BACK PAIN WITH BILATERAL SCIATICA: Primary | ICD-10-CM

## 2024-12-03 DIAGNOSIS — G89.29 CHRONIC BILATERAL LOW BACK PAIN WITH BILATERAL SCIATICA: Primary | ICD-10-CM

## 2024-12-03 DIAGNOSIS — M54.9 DORSALGIA, UNSPECIFIED: ICD-10-CM

## 2024-12-03 PROCEDURE — 3078F DIAST BP <80 MM HG: CPT | Mod: CPTII,,, | Performed by: NURSE PRACTITIONER

## 2024-12-03 PROCEDURE — 3288F FALL RISK ASSESSMENT DOCD: CPT | Mod: CPTII,,, | Performed by: NURSE PRACTITIONER

## 2024-12-03 PROCEDURE — 1160F RVW MEDS BY RX/DR IN RCRD: CPT | Mod: CPTII,,, | Performed by: NURSE PRACTITIONER

## 2024-12-03 PROCEDURE — 1101F PT FALLS ASSESS-DOCD LE1/YR: CPT | Mod: CPTII,,, | Performed by: NURSE PRACTITIONER

## 2024-12-03 PROCEDURE — 3075F SYST BP GE 130 - 139MM HG: CPT | Mod: CPTII,,, | Performed by: NURSE PRACTITIONER

## 2024-12-03 PROCEDURE — 99214 OFFICE O/P EST MOD 30 MIN: CPT | Mod: ,,, | Performed by: NURSE PRACTITIONER

## 2024-12-03 PROCEDURE — 1159F MED LIST DOCD IN RCRD: CPT | Mod: CPTII,,, | Performed by: NURSE PRACTITIONER

## 2024-12-03 PROCEDURE — 1125F AMNT PAIN NOTED PAIN PRSNT: CPT | Mod: CPTII,,, | Performed by: NURSE PRACTITIONER

## 2024-12-03 RX ORDER — ELECTROLYTES/DEXTROSE
1 SOLUTION, ORAL ORAL DAILY
COMMUNITY

## 2024-12-03 NOTE — PROGRESS NOTES
Pain Management Clinic    Subjective:     Chief Complaint: Post-op Evaluation (f/u Bilateral [id number].  Patient reports 85% relief of symptoms for a week, but since the pain has returned.  )    Referred by: No ref. provider found     History of Present Illness: Corrine French is a 74 y.o. female presents today as a postop follow-up for pain after completing a bilateral L3 transforaminal epidural steroid injection on 11/14/2024 .  Chronic bilateral low back pain with bilateral sciatica.  Patient received excellent pain relief of about 85% reduction of her symptoms for a week after receiving this epidural steroid injection.  Unfortunately her pain has returned and it remains located to her bilateral low back, buttocks, bilateral hips and will radiate down bilateral posterior thighs.  Patient completed greater than 6 weeks of physical therapy which exacerbated her pain as a result.  Patient was also wondering if she has some new changes in her MRI because this new pain occurred in April of this year when she injured her back.  Her last MRI was completed in 2023.  Now her pain has returned in his worse when she leans forward and tries to stand straight causes intense pain to her lower bilateral buttocks worse on the right.  She currently takes gabapentin 600 mg at night to sleep and tries lower doses of 300 mg 1 to maybe 2 times a day with minimal relief of pain during the day however she is able to have more restorative sleep with the higher dose of gabapentin at night.  She also has noticed when she is resuming her chores especially those that require twisting at the waist standing longer periods of time and walking longer.  This will elevate her pain score to a 8/10 on the NRS.        History of Present Illness:  During last office visit with me on 10/10/2024 included the following:    Corrine French is a 73 y.o. female presents today as a three-week follow-up after completing bilateral EMG/NCV to go  "over results and plan of care.  Results showed chronic bilateral L3 +L4 radiculopathy without denervation.  Patient continues to have pain to bilateral low back, buttocks and bilateral hips as well as pain down bilateral posterior thighs.  She completed 6 weeks of physical therapy with exacerbated pain as a result.  She currently takes tizanidine as needed as well as Celebrex and she will start methotrexate soon.  Patient states her pain will elevate in these areas to a 7-8/10 on the NRS with cleaning, sweeping mopping and other household chores.  At night when she is turning and tossing in bed defined a non painful position this will elevate her pain score to a 10/10.  Additionally if she rides in a golf cart this will elevate her pain to a 10/10 as well.  She describes this as a sharp stabbing sensation.  She would like to proceed with a bilateral transforaminal epidural steroid injection to L3.     Pertinent PMH:  Right breast cancer, osteoporosis, rheumatoid arthritis, bilateral sciatica, hyperlipidemia  Pertinent PSH:  Bilateral breast lumpectomy, no spinal surgeries, pacemaker, defibrillator or spinal cord stimulator    Vital signs:   Visit Vitals  BP (!) 136/57 (BP Location: Left arm, Patient Position: Sitting)   Pulse 78   Resp 20   Ht 5' 2" (1.575 m)   Wt 70.3 kg (154 lb 15.7 oz)   BMI 28.35 kg/m²      Vitals:    12/03/24 1106   PainSc:   5     Pain Disability Index (PDI): 34       Interventional Pain History  11/14/2024:  Bilateral L3 TFESI    ROS: Low back and leg pain     EMG/NCV results 2024  (lower extremities):    Results showed chronic bilateral L3 +L4 radiculopathy without denervation.     MRI lumbar spine 2023:   1. Disc space narrowing, mild annular bulging L1-L2.  2. Annular bulging L3-L4.  3. Disc space narrowing with uncovering of the disc and borderline canal stenosis L4-L5. Alignment deformity is present at this level.  4. Multilevel posterior element hypertrophy.     FINDINGS: Conus " medullaris tapers at the T12-L1 level. Multilevel lumbar disc desiccation is demonstrated. Lumbar vertebral body stature is maintained. There is alignment deformity at the L4-5 level with anterolisthesis of L4 upon L5 with pars interarticularis defects of L4 suspected.    L1-L2: Disc space narrowing. Schmorl's nodes are identified at the contiguous endplates at this level. Mild annular bulging. Spinal canal and neural foramina are patent.    L2-L3: Spinal canal and neural foramina are patent. Mild annular bulging. There is posterior element hypertrophy.    L3-L4: Annular bulging. Spinal canal and neural foramen are patent. There is posterior element hypertrophy.    L4-L5: Disc space narrowing. Uncovering of the disc at this level. There is effacement of the spinal canal with borderline spinal canal stenosis. There is effacement of the right greater than left subarticular recess. Neural foramina are patent.    L5-S1: Spinal canal and left neural foramen appear patent. There is effacement of the right subarticular recess and right neural foramen secondary to posterior element hypertrophy asymmetric toward the right.      Objective:        Physical Exam  General: Well developed; overweight; A&O x 3; No anxiety/depression; NAD  Mental Status: Oriented to person, palce and time. Displays appropriate mood & affect.  Head: Norm cephalic and atraumatic  Neck:  No cervical paraspinal banding.  Full range of motion with lateral turning and cervical flexion +extension.  Eyes: normal conjunctiva, normal lids, normal pupils  ENT and mouth: normal external ear, nose, and no lesions noted on the lips.  Respiratory: Symmetrical, Unlabored. No dyspnea  CV: normal rhythm and rate. No peripheral edema.   Abdomen: Non-distended    Extremities:  Gen: No cyanosis or tenderness to palpation bilateral upper and lower extremities  Skin: Warm, pink, dry, no rashes, no lesions on the lumbar spine  Strength: 5/5 motor strength bilateral upper  and lower extremities  ROM: Full ROM in bilateral knees and ankles without pain or instability.    Neuro:  Gait: no altalgic lean, normal toe and heel raise. Independent ambulator.  DTR's: 2+ in bilateral patellar, and ankle  Sensory: Intact to light touch bilateral  upper and lower extremities    Spine: Normal lordosis. No scoliosis  L-spine ROM: Full ROM to flexion, extension, bilateral rotation,   Straight Leg Raise:  Positive right, positive left  SI Joint: No tenderness to palpation bilaterally.      Assessment:         Corrine French is a 74 y.o. female presents today as a postop follow-up for pain after completing a bilateral L3 transforaminal epidural steroid injection on 11/14/2024 .  Chronic bilateral low back pain with bilateral sciatica.  Patient received excellent pain relief of about 85% reduction of her symptoms for a week after receiving this epidural steroid injection.  Unfortunately her pain has returned and it remains located to her bilateral low back, buttocks, bilateral hips and will radiate down bilateral posterior thighs.  Patient completed greater than 6 weeks of physical therapy which exacerbated her pain as a result.  Patient was also wondering if she has some new changes in her MRI because this new pain occurred in April of this year when she injured her back.  Her last MRI was completed in 2023.  Now her pain has returned in his worse when she leans forward and tries to stand straight causes intense pain to her lower bilateral buttocks worse on the right.  She currently takes gabapentin 600 mg at night to sleep and tries lower doses of 300 mg 1 to maybe 2 times a day with minimal relief of pain during the day however she is able to have more restorative sleep with the higher dose of gabapentin at night.  She also has noticed when she is resuming her chores especially those that require twisting at the waist standing longer periods of time and walking longer.  This will elevate her pain  "score to a 8/10 on the NRS.    Plan of care:  Lumbar MRI ordered  Patient to follow-up in 3 weeks to go over the results and plan of care.  Suspect lumbar foraminal stenosis L5-S1.  Encounter Diagnosis   Name Primary?    Chronic bilateral low back pain with bilateral sciatica Yes         Plan:       Corrine Mohr" was seen today for post-op evaluation.    Diagnoses and all orders for this visit:    Chronic bilateral low back pain with bilateral sciatica           Past Medical History:   Diagnosis Date    Breast cancer in female     Rt breast    Hyperlipidemia     Osteoporosis     Rheumatoid arthritis, unspecified     Sciatica  BILATERAl     SEASONAL ALLERGIES        Past Surgical History:   Procedure Laterality Date    CHOLECYSTECTOMY      ENDOSCOPIC RELEASE OF BOTH CARPAL TUNNELS Bilateral     HEMORRHOID SURGERY N/A     HYSTERECTOMY      LUMPECTOMY, BREAST Right     LUMPECTOMY, BREAST Left     TRANSFORAMINAL EPIDURAL INJECTION OF STEROID Bilateral 11/14/2024    Procedure: INJECTION, STEROID, EPIDURAL, TRANSFORAMINAL APPROACH // Bilateral L3;  Surgeon: Nina Ruiz MD;  Location: [address];  Service: Pain Management;  Laterality: Bilateral;  TFESI Jermaine L3       Family History   Problem Relation Name Age of Onset    Breast cancer Mother      Diabetes Mother      Hypertension Mother      Stroke Father         Social History     Socioeconomic History    Marital status:    Tobacco Use    Smoking status: Never    Smokeless tobacco: Never   Substance and Sexual Activity    Alcohol use: Yes     Comment: wine and liquor 2 drinks/day    Drug use: Never       Current Outpatient Medications   Medication Sig Dispense Refill    albuterol (PROVENTIL/VENTOLIN HFA) 90 mcg/actuation inhaler Inhale 2 puffs into the lungs.      alendronate (FOSAMAX) 70 MG tablet Take 70 mg by mouth every 7 days.      ascorbic acid, vitamin C, (VITAMIN C) 1000 MG tablet Take 1 tablet by mouth every morning.      aspirin 81 MG Chew Take 1 " tablet by mouth every morning.      biotin 5 mg Cap 1 capsule once daily.      busPIRone (BUSPAR) 5 MG Tab Take 5 mg by mouth.      calcium-vitamin D tablet 600 mg-200 units 1 tablet with a meal Orally twice a day      celecoxib (CELEBREX) 200 MG capsule Take 200 mg by mouth.      fluticasone propionate (FLONASE) 50 mcg/actuation nasal spray 1 spray by Nasal route daily as needed.      folic acid (FOLVITE) 1 MG tablet Take 1,000 mcg by mouth.      letrozole (FEMARA) 2.5 mg Tab Take 2.5 mg by mouth.      LUMIGAN 0.01 % Drop Place into both eyes.      methotrexate 2.5 MG Tab Take 10 mg by mouth every 7 days.      montelukast (SINGULAIR) 10 mg tablet Take 1 tablet by mouth every evening.      multivit-minerals/folic acid (ADULT ONE DAILY MULTIVITAMIN ORAL) once daily.      omeprazole (PRILOSEC) 40 MG capsule Take 40 mg by mouth.      simvastatin (ZOCOR) 40 MG tablet Take 1 tablet by mouth every evening.      tiZANidine (ZANAFLEX) 4 MG tablet Take 4 mg by mouth every evening.      fluticasone-salmeterol diskus inhaler 100-50 mcg Inhale 1 puff into the lungs.       No current facility-administered medications for this visit.       Review of patient's allergies indicates:   Allergen Reactions    Codeine Itching, Rash and Other (See Comments)     Other Reaction(s): C/O: itching    Hydrocodone Itching and Rash    Oxycodone-acetaminophen

## 2024-12-12 ENCOUNTER — TELEPHONE (OUTPATIENT)
Facility: CLINIC | Age: 74
End: 2024-12-12
Payer: MEDICARE

## 2025-01-15 ENCOUNTER — OFFICE VISIT (OUTPATIENT)
Facility: CLINIC | Age: 75
End: 2025-01-15
Payer: MEDICARE

## 2025-01-15 VITALS
HEIGHT: 62 IN | WEIGHT: 155 LBS | DIASTOLIC BLOOD PRESSURE: 74 MMHG | BODY MASS INDEX: 28.52 KG/M2 | SYSTOLIC BLOOD PRESSURE: 131 MMHG | RESPIRATION RATE: 18 BRPM | HEART RATE: 76 BPM

## 2025-01-15 DIAGNOSIS — M54.42 CHRONIC BILATERAL LOW BACK PAIN WITH BILATERAL SCIATICA: Primary | ICD-10-CM

## 2025-01-15 DIAGNOSIS — M54.9 DORSALGIA, UNSPECIFIED: ICD-10-CM

## 2025-01-15 DIAGNOSIS — G89.29 CHRONIC BILATERAL LOW BACK PAIN WITH BILATERAL SCIATICA: Primary | ICD-10-CM

## 2025-01-15 DIAGNOSIS — M54.41 CHRONIC BILATERAL LOW BACK PAIN WITH BILATERAL SCIATICA: Primary | ICD-10-CM

## 2025-01-15 DIAGNOSIS — M51.16 INTERVERTEBRAL DISC DISORDERS WITH RADICULOPATHY, LUMBAR REGION: ICD-10-CM

## 2025-01-15 PROCEDURE — 99214 OFFICE O/P EST MOD 30 MIN: CPT | Mod: ,,, | Performed by: NURSE PRACTITIONER

## 2025-01-15 PROCEDURE — 3288F FALL RISK ASSESSMENT DOCD: CPT | Mod: CPTII,,, | Performed by: NURSE PRACTITIONER

## 2025-01-15 PROCEDURE — 1125F AMNT PAIN NOTED PAIN PRSNT: CPT | Mod: CPTII,,, | Performed by: NURSE PRACTITIONER

## 2025-01-15 PROCEDURE — 1101F PT FALLS ASSESS-DOCD LE1/YR: CPT | Mod: CPTII,,, | Performed by: NURSE PRACTITIONER

## 2025-01-15 PROCEDURE — 3078F DIAST BP <80 MM HG: CPT | Mod: CPTII,,, | Performed by: NURSE PRACTITIONER

## 2025-01-15 PROCEDURE — 1160F RVW MEDS BY RX/DR IN RCRD: CPT | Mod: CPTII,,, | Performed by: NURSE PRACTITIONER

## 2025-01-15 PROCEDURE — 3075F SYST BP GE 130 - 139MM HG: CPT | Mod: CPTII,,, | Performed by: NURSE PRACTITIONER

## 2025-01-15 PROCEDURE — 1159F MED LIST DOCD IN RCRD: CPT | Mod: CPTII,,, | Performed by: NURSE PRACTITIONER

## 2025-01-15 NOTE — H&P (VIEW-ONLY)
[facility]    Subjective:     Chief Complaint: Back Pain (Patient symptoms are the same.  She is experiencing lower back pain that radiates down her bilateral buttock down to her calves. )    Referred by: No ref. provider found     History of Present Illness: Corrine French is a 74 y.o. female presents today as a three-week follow-up of MRI lumbar spine and to go over results and plan of care.  Not findings were noted at L4-L5 with the following:L4-5: Anterolisthesis of L4 on L5 with associated disc osteophyte complex and facet disease all contribute to severe central canal stenosis with severe bilateral neural foraminal and lateral recess narrowing..  Patient relayed she received greater than 80% relief of pain especially with egress sitting from lying down and standing up after receiving her bilateral L3 epidural steroid injection in November of 2024.  She is satisfied with that pain however she still has notable pain with prolonged standing and she feels the pain radiating down both sides of her legs in his worse when sitting to her legs.  Initially she reported the epidural steroid injection to bilateral L3 in November only helped her for a week however there was a delayed response and she felt more ease egress sitting out of bed and has enjoyed that.  Now she has more excruciating pain with standing too long with the pain that will radiate down the lateral knee and lateral legs in his worse with prolonged sitting.  These activities will elevate her pain score to a 8/10 sometimes a 9/10 on the NRS.  Her pain score today is 3/10.  Her primary pain is the legs at this point and pain with problems egress sitting from sitting to standing.  Her legs feel a little heavy as well.  She would like to proceed with a bilateral L4 epidural steroid injection.  She has completed physical therapy for months with no sustainable pain relief.  She also takes  gabapentin 600 mg at night to sleep and tries lower  "doses of 300 mg 1 to maybe 2 times a day with minimal relief of pain during the day however she is able to have more restorative sleep with the higher dose of gabapentin at night.  She also has noticed when she is resuming her chores especially those that require twisting at the waist standing longer periods of time and walking longer.  This will elevate her pain score to a 8/10 on the NRS.  Patient also has pertinent past medical history of rheumatoid arthritis and takes methotrexate.  She would like to proceed with bilateral L4 epidural steroid injections to see if this will provide more sustainable pain relief.        Pertinent PMH:  Right breast cancer, osteoporosis, rheumatoid arthritis, bilateral sciatica, hyperlipidemia  Pertinent PSH:  Bilateral breast lumpectomy, no spinal surgeries, pacemaker, defibrillator or spinal cord stimulator            Visit Vitals  /74 (BP Location: Left arm, Patient Position: Sitting)   Pulse 76   Resp 18   Ht 5' 2" (1.575 m)   Wt 70.3 kg (154 lb 15.7 oz)   BMI 28.35 kg/m²      Vitals:    01/15/25 1051   PainSc:   3     Pain Disability Index (PDI): 30       Interventional Pain History  11/14/2024:  Bilateral L3 TFESI       ROS: Low back and leg pain    EMG/NCV results 2024  (lower extremities):    Results showed chronic bilateral L3 +L4 radiculopathy without denervation.        MRI lumbar spine 2024:  Impression    Multilevel lumbar spondylosis and facet disease with grade 1 anterolisthesis of L4 on L5 secondary to bilateral pars defects.    Multilevel neural foraminal and central canal stenosis most significant at L4-5 where there is severe central canal stenosis and bilateral neural foraminal and lateral recess narrowing. Please see above level by level interpretation for details.  Narrative     MRI LUMBAR SPINE WO CONTRAST      MRI lumbar spine 2024:  FINDINGS: There is no acute fracture. Severe degenerative disc space narrowing with degenerative endplate edema at L4-5 " with grade 1 anterolisthesis of L4 on L5. Bilateral pars defects. Disc space narrowing at L1-2 is similar. The prevertebral and posterior paraspinal soft tissues are normal. Images of the abdomen are within normal limits. The visualized sacrum is unremarkable. The conus medullaris terminates normally. No cord signal abnormality.    L1-2: Disc osteophyte complex with facet disease contributes to mild bilateral neural foraminal narrowing.    L2-3: Annular disc bulging with ligamentum flavum thickening and facet disease contributes to mild right neural foraminal narrowing.    L3-4: Annular disc bulging with ligamentum flavum thickening and facet disease contributes to mild-to-moderate bilateral neural foraminal narrowing with bilateral lateral recess encroachment.    L4-5: Anterolisthesis of L4 on L5 with associated disc osteophyte complex and facet disease all contribute to severe central canal stenosis with severe bilateral neural foraminal and lateral recess narrowing.    L5-S1: Disc osteophyte complex with facet disease contributes to mild bilateral neural foraminal narrowing.    Objective:        Physical Exam  General: Well developed; overweight; A&O x 3; No anxiety/depression; NAD  Mental Status: Oriented to person, palce and time. Displays appropriate mood & affect.  Head: Norm cephalic and atraumatic  Neck:  No cervical paraspinal banding.  Full range of motion with lateral turning and cervical flexion +extension.  Eyes: normal conjunctiva, normal lids, normal pupils  ENT and mouth: normal external ear, nose, and no lesions noted on the lips.  Respiratory: Symmetrical, Unlabored. No dyspnea  CV: normal rhythm and rate. No peripheral edema.   Abdomen: Non-distended     Extremities:  Gen: No cyanosis or tenderness to palpation bilateral upper and lower extremities  Skin: Warm, pink, dry, no rashes, no lesions on the lumbar spine  Strength: 5/5 motor strength bilateral upper and lower extremities  ROM: Full ROM  in bilateral knees and ankles without pain or instability.     Neuro:  Gait: no altalgic lean, normal toe and heel raise. Independent ambulator.  DTR's: 2+ in bilateral patellar,   Sensory: Intact to light touch bilateral  upper and lower extremities     Spine: Normal lordosis. No scoliosis  L-spine ROM: Full ROM to flexion, extension, bilateral rotation,   Straight Leg Raise:  Positive bilaterally  SI Joint: No tenderness to palpation bilaterally.         Assessment:     Corrine French is a 74 y.o. female presents today as a three-week follow-up of MRI lumbar spine and to go over results and plan of care.  Not findings were noted at L4-L5 with the following:L4-5: Anterolisthesis of L4 on L5 with associated disc osteophyte complex and facet disease all contribute to severe central canal stenosis with severe bilateral neural foraminal and lateral recess narrowing..  Patient relayed she received greater than 80% relief of pain especially with egress sitting from lying down and standing up after receiving her bilateral L3 epidural steroid injection in November of 2024.  She is satisfied with that pain however she still has notable pain with prolonged standing and she feels the pain radiating down both sides of her legs in his worse when sitting to her legs.  Initially she reported the epidural steroid injection to bilateral L3 in November only helped her for a week however there was a delayed response and she felt more ease egress sitting out of bed and has enjoyed that.  Now she has more excruciating pain with standing too long with the pain that will radiate down the lateral knee and lateral legs in his worse with prolonged sitting.  These activities will elevate her pain score to a 8/10 sometimes a 9/10 on the NRS.  Her pain score today is 3/10.  Her primary pain is the legs at this point and pain with problems egress sitting from sitting to standing.  Her legs feel a little heavy as well.  She would like to  "proceed with a bilateral L4 epidural steroid injection.  She has completed physical therapy for months with no sustainable pain relief.  She also takes  gabapentin 600 mg at night to sleep and tries lower doses of 300 mg 1 to maybe 2 times a day with minimal relief of pain during the day however she is able to have more restorative sleep with the higher dose of gabapentin at night.  She also has noticed when she is resuming her chores especially those that require twisting at the waist standing longer periods of time and walking longer.  This will elevate her pain score to a 8/10 on the NRS.  Patient also has pertinent past medical history of rheumatoid arthritis and takes methotrexate.  She would like to proceed with bilateral L4 epidural steroid injections to see if this will provide more sustainable pain relief.      Plan of care:   Request sent for bilateral L4 TFESI.  Hold methotrexate, aspirin and Celebrex for 1 week and resume postop     Encounter Diagnoses   Name Primary?    Chronic bilateral low back pain with bilateral sciatica Yes    Dorsalgia, unspecified          Plan:       Corrine Mohr" was seen today for back pain.    Diagnoses and all orders for this visit:    Chronic bilateral low back pain with bilateral sciatica    Dorsalgia, unspecified           Past Medical History:   Diagnosis Date    Breast cancer in female     Rt breast    Hyperlipidemia     Osteoporosis     Rheumatoid arthritis, unspecified     Sciatica  BILATERAl     SEASONAL ALLERGIES        Past Surgical History:   Procedure Laterality Date    CHOLECYSTECTOMY      ENDOSCOPIC RELEASE OF BOTH CARPAL TUNNELS Bilateral     HEMORRHOID SURGERY N/A     HYSTERECTOMY      LUMPECTOMY, BREAST Right     LUMPECTOMY, BREAST Left     TRANSFORAMINAL EPIDURAL INJECTION OF STEROID Bilateral 11/14/2024    Procedure: INJECTION, STEROID, EPIDURAL, TRANSFORAMINAL APPROACH // Bilateral L3;  Surgeon: Nina Ruiz MD;  Location: [address];  Service: " Pain Management;  Laterality: Bilateral;  TFESI Jermaine L3       Family History   Problem Relation Name Age of Onset    Breast cancer Mother      Diabetes Mother      Hypertension Mother      Stroke Father         Social History     Socioeconomic History    Marital status:    Tobacco Use    Smoking status: Never    Smokeless tobacco: Never   Substance and Sexual Activity    Alcohol use: Yes     Comment: wine and liquor 2 drinks/day    Drug use: Never       Current Outpatient Medications   Medication Sig Dispense Refill    albuterol (PROVENTIL/VENTOLIN HFA) 90 mcg/actuation inhaler Inhale 2 puffs into the lungs.      alendronate (FOSAMAX) 70 MG tablet Take 70 mg by mouth every 7 days.      ascorbic acid, vitamin C, (VITAMIN C) 1000 MG tablet Take 1 tablet by mouth every morning.      aspirin 81 MG Chew Take 1 tablet by mouth every morning.      biotin 5 mg Cap 1 capsule once daily.      busPIRone (BUSPAR) 5 MG Tab Take 5 mg by mouth.      calcium-vitamin D tablet 600 mg-200 units 1 tablet with a meal Orally twice a day      celecoxib (CELEBREX) 200 MG capsule Take 200 mg by mouth.      fluticasone propionate (FLONASE) 50 mcg/actuation nasal spray 1 spray by Nasal route daily as needed.      folic acid (FOLVITE) 1 MG tablet Take 1,000 mcg by mouth.      letrozole (FEMARA) 2.5 mg Tab Take 2.5 mg by mouth.      LUMIGAN 0.01 % Drop Place into both eyes.      methotrexate 2.5 MG Tab Take 10 mg by mouth every 7 days.      montelukast (SINGULAIR) 10 mg tablet Take 1 tablet by mouth every evening.      multivit-minerals/folic acid (ADULT ONE DAILY MULTIVITAMIN ORAL) once daily.      omeprazole (PRILOSEC) 40 MG capsule Take 40 mg by mouth.      simvastatin (ZOCOR) 40 MG tablet Take 1 tablet by mouth every evening.      tiZANidine (ZANAFLEX) 4 MG tablet Take 4 mg by mouth every evening.      fluticasone-salmeterol diskus inhaler 100-50 mcg Inhale 1 puff into the lungs.       No current facility-administered medications for  this visit.       Review of patient's allergies indicates:   Allergen Reactions    Codeine Itching, Rash and Other (See Comments)     Other Reaction(s): C/O: itching    Hydrocodone Itching and Rash    Oxycodone-acetaminophen

## 2025-01-15 NOTE — PROGRESS NOTES
[facility]    Subjective:     Chief Complaint: Back Pain (Patient symptoms are the same.  She is experiencing lower back pain that radiates down her bilateral buttock down to her calves. )    Referred by: No ref. provider found     History of Present Illness: Corrine French is a 74 y.o. female presents today as a three-week follow-up of MRI lumbar spine and to go over results and plan of care.  Not findings were noted at L4-L5 with the following:L4-5: Anterolisthesis of L4 on L5 with associated disc osteophyte complex and facet disease all contribute to severe central canal stenosis with severe bilateral neural foraminal and lateral recess narrowing..  Patient relayed she received greater than 80% relief of pain especially with egress sitting from lying down and standing up after receiving her bilateral L3 epidural steroid injection in November of 2024.  She is satisfied with that pain however she still has notable pain with prolonged standing and she feels the pain radiating down both sides of her legs in his worse when sitting to her legs.  Initially she reported the epidural steroid injection to bilateral L3 in November only helped her for a week however there was a delayed response and she felt more ease egress sitting out of bed and has enjoyed that.  Now she has more excruciating pain with standing too long with the pain that will radiate down the lateral knee and lateral legs in his worse with prolonged sitting.  These activities will elevate her pain score to a 8/10 sometimes a 9/10 on the NRS.  Her pain score today is 3/10.  Her primary pain is the legs at this point and pain with problems egress sitting from sitting to standing.  Her legs feel a little heavy as well.  She would like to proceed with a bilateral L4 epidural steroid injection.  She has completed physical therapy for months with no sustainable pain relief.  She also takes  gabapentin 600 mg at night to sleep and tries lower  "doses of 300 mg 1 to maybe 2 times a day with minimal relief of pain during the day however she is able to have more restorative sleep with the higher dose of gabapentin at night.  She also has noticed when she is resuming her chores especially those that require twisting at the waist standing longer periods of time and walking longer.  This will elevate her pain score to a 8/10 on the NRS.  Patient also has pertinent past medical history of rheumatoid arthritis and takes methotrexate.  She would like to proceed with bilateral L4 epidural steroid injections to see if this will provide more sustainable pain relief.        Pertinent PMH:  Right breast cancer, osteoporosis, rheumatoid arthritis, bilateral sciatica, hyperlipidemia  Pertinent PSH:  Bilateral breast lumpectomy, no spinal surgeries, pacemaker, defibrillator or spinal cord stimulator            Visit Vitals  /74 (BP Location: Left arm, Patient Position: Sitting)   Pulse 76   Resp 18   Ht 5' 2" (1.575 m)   Wt 70.3 kg (154 lb 15.7 oz)   BMI 28.35 kg/m²      Vitals:    01/15/25 1051   PainSc:   3     Pain Disability Index (PDI): 30       Interventional Pain History  11/14/2024:  Bilateral L3 TFESI       ROS: Low back and leg pain    EMG/NCV results 2024  (lower extremities):    Results showed chronic bilateral L3 +L4 radiculopathy without denervation.        MRI lumbar spine 2024:  Impression    Multilevel lumbar spondylosis and facet disease with grade 1 anterolisthesis of L4 on L5 secondary to bilateral pars defects.    Multilevel neural foraminal and central canal stenosis most significant at L4-5 where there is severe central canal stenosis and bilateral neural foraminal and lateral recess narrowing. Please see above level by level interpretation for details.  Narrative     MRI LUMBAR SPINE WO CONTRAST      MRI lumbar spine 2024:  FINDINGS: There is no acute fracture. Severe degenerative disc space narrowing with degenerative endplate edema at L4-5 " with grade 1 anterolisthesis of L4 on L5. Bilateral pars defects. Disc space narrowing at L1-2 is similar. The prevertebral and posterior paraspinal soft tissues are normal. Images of the abdomen are within normal limits. The visualized sacrum is unremarkable. The conus medullaris terminates normally. No cord signal abnormality.    L1-2: Disc osteophyte complex with facet disease contributes to mild bilateral neural foraminal narrowing.    L2-3: Annular disc bulging with ligamentum flavum thickening and facet disease contributes to mild right neural foraminal narrowing.    L3-4: Annular disc bulging with ligamentum flavum thickening and facet disease contributes to mild-to-moderate bilateral neural foraminal narrowing with bilateral lateral recess encroachment.    L4-5: Anterolisthesis of L4 on L5 with associated disc osteophyte complex and facet disease all contribute to severe central canal stenosis with severe bilateral neural foraminal and lateral recess narrowing.    L5-S1: Disc osteophyte complex with facet disease contributes to mild bilateral neural foraminal narrowing.    Objective:        Physical Exam  General: Well developed; overweight; A&O x 3; No anxiety/depression; NAD  Mental Status: Oriented to person, palce and time. Displays appropriate mood & affect.  Head: Norm cephalic and atraumatic  Neck:  No cervical paraspinal banding.  Full range of motion with lateral turning and cervical flexion +extension.  Eyes: normal conjunctiva, normal lids, normal pupils  ENT and mouth: normal external ear, nose, and no lesions noted on the lips.  Respiratory: Symmetrical, Unlabored. No dyspnea  CV: normal rhythm and rate. No peripheral edema.   Abdomen: Non-distended     Extremities:  Gen: No cyanosis or tenderness to palpation bilateral upper and lower extremities  Skin: Warm, pink, dry, no rashes, no lesions on the lumbar spine  Strength: 5/5 motor strength bilateral upper and lower extremities  ROM: Full ROM  in bilateral knees and ankles without pain or instability.     Neuro:  Gait: no altalgic lean, normal toe and heel raise. Independent ambulator.  DTR's: 2+ in bilateral patellar,   Sensory: Intact to light touch bilateral  upper and lower extremities     Spine: Normal lordosis. No scoliosis  L-spine ROM: Full ROM to flexion, extension, bilateral rotation,   Straight Leg Raise:  Positive bilaterally  SI Joint: No tenderness to palpation bilaterally.         Assessment:     Corrine French is a 74 y.o. female presents today as a three-week follow-up of MRI lumbar spine and to go over results and plan of care.  Not findings were noted at L4-L5 with the following:L4-5: Anterolisthesis of L4 on L5 with associated disc osteophyte complex and facet disease all contribute to severe central canal stenosis with severe bilateral neural foraminal and lateral recess narrowing..  Patient relayed she received greater than 80% relief of pain especially with egress sitting from lying down and standing up after receiving her bilateral L3 epidural steroid injection in November of 2024.  She is satisfied with that pain however she still has notable pain with prolonged standing and she feels the pain radiating down both sides of her legs in his worse when sitting to her legs.  Initially she reported the epidural steroid injection to bilateral L3 in November only helped her for a week however there was a delayed response and she felt more ease egress sitting out of bed and has enjoyed that.  Now she has more excruciating pain with standing too long with the pain that will radiate down the lateral knee and lateral legs in his worse with prolonged sitting.  These activities will elevate her pain score to a 8/10 sometimes a 9/10 on the NRS.  Her pain score today is 3/10.  Her primary pain is the legs at this point and pain with problems egress sitting from sitting to standing.  Her legs feel a little heavy as well.  She would like to  "proceed with a bilateral L4 epidural steroid injection.  She has completed physical therapy for months with no sustainable pain relief.  She also takes  gabapentin 600 mg at night to sleep and tries lower doses of 300 mg 1 to maybe 2 times a day with minimal relief of pain during the day however she is able to have more restorative sleep with the higher dose of gabapentin at night.  She also has noticed when she is resuming her chores especially those that require twisting at the waist standing longer periods of time and walking longer.  This will elevate her pain score to a 8/10 on the NRS.  Patient also has pertinent past medical history of rheumatoid arthritis and takes methotrexate.  She would like to proceed with bilateral L4 epidural steroid injections to see if this will provide more sustainable pain relief.      Plan of care:   Request sent for bilateral L4 TFESI.  Hold methotrexate, aspirin and Celebrex for 1 week and resume postop     Encounter Diagnoses   Name Primary?    Chronic bilateral low back pain with bilateral sciatica Yes    Dorsalgia, unspecified          Plan:       Corrine Mohr" was seen today for back pain.    Diagnoses and all orders for this visit:    Chronic bilateral low back pain with bilateral sciatica    Dorsalgia, unspecified           Past Medical History:   Diagnosis Date    Breast cancer in female     Rt breast    Hyperlipidemia     Osteoporosis     Rheumatoid arthritis, unspecified     Sciatica  BILATERAl     SEASONAL ALLERGIES        Past Surgical History:   Procedure Laterality Date    CHOLECYSTECTOMY      ENDOSCOPIC RELEASE OF BOTH CARPAL TUNNELS Bilateral     HEMORRHOID SURGERY N/A     HYSTERECTOMY      LUMPECTOMY, BREAST Right     LUMPECTOMY, BREAST Left     TRANSFORAMINAL EPIDURAL INJECTION OF STEROID Bilateral 11/14/2024    Procedure: INJECTION, STEROID, EPIDURAL, TRANSFORAMINAL APPROACH // Bilateral L3;  Surgeon: Nina Ruiz MD;  Location: [address];  Service: " Pain Management;  Laterality: Bilateral;  TFESI Jermaine L3       Family History   Problem Relation Name Age of Onset    Breast cancer Mother      Diabetes Mother      Hypertension Mother      Stroke Father         Social History     Socioeconomic History    Marital status:    Tobacco Use    Smoking status: Never    Smokeless tobacco: Never   Substance and Sexual Activity    Alcohol use: Yes     Comment: wine and liquor 2 drinks/day    Drug use: Never       Current Outpatient Medications   Medication Sig Dispense Refill    albuterol (PROVENTIL/VENTOLIN HFA) 90 mcg/actuation inhaler Inhale 2 puffs into the lungs.      alendronate (FOSAMAX) 70 MG tablet Take 70 mg by mouth every 7 days.      ascorbic acid, vitamin C, (VITAMIN C) 1000 MG tablet Take 1 tablet by mouth every morning.      aspirin 81 MG Chew Take 1 tablet by mouth every morning.      biotin 5 mg Cap 1 capsule once daily.      busPIRone (BUSPAR) 5 MG Tab Take 5 mg by mouth.      calcium-vitamin D tablet 600 mg-200 units 1 tablet with a meal Orally twice a day      celecoxib (CELEBREX) 200 MG capsule Take 200 mg by mouth.      fluticasone propionate (FLONASE) 50 mcg/actuation nasal spray 1 spray by Nasal route daily as needed.      folic acid (FOLVITE) 1 MG tablet Take 1,000 mcg by mouth.      letrozole (FEMARA) 2.5 mg Tab Take 2.5 mg by mouth.      LUMIGAN 0.01 % Drop Place into both eyes.      methotrexate 2.5 MG Tab Take 10 mg by mouth every 7 days.      montelukast (SINGULAIR) 10 mg tablet Take 1 tablet by mouth every evening.      multivit-minerals/folic acid (ADULT ONE DAILY MULTIVITAMIN ORAL) once daily.      omeprazole (PRILOSEC) 40 MG capsule Take 40 mg by mouth.      simvastatin (ZOCOR) 40 MG tablet Take 1 tablet by mouth every evening.      tiZANidine (ZANAFLEX) 4 MG tablet Take 4 mg by mouth every evening.      fluticasone-salmeterol diskus inhaler 100-50 mcg Inhale 1 puff into the lungs.       No current facility-administered medications for  this visit.       Review of patient's allergies indicates:   Allergen Reactions    Codeine Itching, Rash and Other (See Comments)     Other Reaction(s): C/O: itching    Hydrocodone Itching and Rash    Oxycodone-acetaminophen

## 2025-02-12 ENCOUNTER — HOSPITAL ENCOUNTER (OUTPATIENT)
Facility: HOSPITAL | Age: 75
Discharge: HOME | End: 2025-02-12
Attending: ANESTHESIOLOGY | Admitting: ANESTHESIOLOGY
Payer: MEDICARE

## 2025-02-12 VITALS
DIASTOLIC BLOOD PRESSURE: 76 MMHG | OXYGEN SATURATION: 98 % | HEIGHT: 62 IN | HEART RATE: 76 BPM | BODY MASS INDEX: 28.24 KG/M2 | TEMPERATURE: 98 F | RESPIRATION RATE: 18 BRPM | WEIGHT: 153.44 LBS | SYSTOLIC BLOOD PRESSURE: 147 MMHG

## 2025-02-12 DIAGNOSIS — G89.29 CHRONIC BACK PAIN GREATER THAN 3 MONTHS DURATION: Primary | ICD-10-CM

## 2025-02-12 DIAGNOSIS — M54.9 CHRONIC BACK PAIN GREATER THAN 3 MONTHS DURATION: Primary | ICD-10-CM

## 2025-02-12 PROCEDURE — 64483 NJX AA&/STRD TFRM EPI L/S 1: CPT | Mod: 50 | Performed by: ANESTHESIOLOGY

## 2025-02-12 PROCEDURE — 25000003 PHARM REV CODE 250: Performed by: ANESTHESIOLOGY

## 2025-02-12 PROCEDURE — 63600175 PHARM REV CODE 636 W HCPCS: Performed by: ANESTHESIOLOGY

## 2025-02-12 RX ORDER — LIDOCAINE HYDROCHLORIDE 10 MG/ML
INJECTION, SOLUTION EPIDURAL; INFILTRATION; INTRACAUDAL; PERINEURAL
Status: DISCONTINUED | OUTPATIENT
Start: 2025-02-12 | End: 2025-02-12 | Stop reason: HOSPADM

## 2025-02-12 RX ORDER — BUPIVACAINE HYDROCHLORIDE 2.5 MG/ML
INJECTION, SOLUTION EPIDURAL; INFILTRATION; INTRACAUDAL
Status: DISCONTINUED
Start: 2025-02-12 | End: 2025-02-12 | Stop reason: HOSPADM

## 2025-02-12 RX ORDER — DEXAMETHASONE SODIUM PHOSPHATE 10 MG/ML
INJECTION, SOLUTION INTRA-ARTICULAR; INTRALESIONAL; INTRAMUSCULAR; INTRAVENOUS; SOFT TISSUE
Status: DISCONTINUED
Start: 2025-02-12 | End: 2025-02-12 | Stop reason: HOSPADM

## 2025-02-12 RX ORDER — DEXAMETHASONE SODIUM PHOSPHATE 10 MG/ML
INJECTION, SOLUTION INTRA-ARTICULAR; INTRALESIONAL; INTRAMUSCULAR; INTRAVENOUS; SOFT TISSUE
Status: DISCONTINUED | OUTPATIENT
Start: 2025-02-12 | End: 2025-02-12 | Stop reason: HOSPADM

## 2025-02-12 RX ORDER — BUPIVACAINE HYDROCHLORIDE 2.5 MG/ML
INJECTION, SOLUTION EPIDURAL; INFILTRATION; INTRACAUDAL
Status: DISCONTINUED | OUTPATIENT
Start: 2025-02-12 | End: 2025-02-12 | Stop reason: HOSPADM

## 2025-02-12 RX ORDER — DIAZEPAM 5 MG/1
10 TABLET ORAL
Status: DISCONTINUED | OUTPATIENT
Start: 2025-02-12 | End: 2025-02-12 | Stop reason: HOSPADM

## 2025-02-12 RX ORDER — LIDOCAINE HYDROCHLORIDE 10 MG/ML
INJECTION, SOLUTION EPIDURAL; INFILTRATION; INTRACAUDAL; PERINEURAL
Status: DISCONTINUED
Start: 2025-02-12 | End: 2025-02-12 | Stop reason: HOSPADM

## 2025-02-12 RX ADMIN — DIAZEPAM 10 MG: 5 TABLET ORAL at 09:02

## 2025-02-12 NOTE — DISCHARGE SUMMARY
[facility] Surgical - Periop Services  Discharge Note  Short Stay    Procedure(s) (LRB):  INJECTION, STEROID, EPIDURAL, TRANSFORAMINAL APPROACH / TFESI Bilateral L4 (Bilateral)      OUTCOME: Patient tolerated treatment/procedure well without complication and is now ready for discharge.    DISPOSITION: Home or Self Care    FINAL DIAGNOSIS:  <principal problem not specified>    FOLLOWUP: In clinic    DISCHARGE INSTRUCTIONS:  No discharge procedures on file.     TIME SPENT ON DISCHARGE: 5 minutes

## 2025-02-12 NOTE — OP NOTE
Procedure:    Left L4  transforaminal epidural steroid injection    Right L4  transforaminal epidural steroid injection    Pre-Procedure Diagnoses:  Chronic back pain greater than 3 months  Lumbar degenerative disc disease  Lumbar radiculopathy  Lumbar disc displacement    Post-Procedure Diagnoses:  Chronic back pain greater than 3 months  Lumbar degenerative disc disease  Lumbar radiculopathy  Lumbar disc displacement    Anesthesia:  Local    Estimated Blood Loss:  < 2 ML    Consent:  The procedure, risks, benefits, and alternatives were discussed with the patient.  The patient voiced understanding and fully informed written consent was obtained.    Description of the Procedure:  The patient was taken to the operating room and placed in the prone position. The skin overlying the lumbar spine was prepped with Chloraprep and draped in the usual sterile fashion.  An oblique fluoroscopic view was obtained on the left side at L4, with the superior articular process of the inferior vertebral body aligned with the pedicle.  Skin anesthesia was achieved using 2 mL of lidocaine 1%.  A 22-gauge 5 -inch Quinke spinal needle was inserted and advanced under intermittent fluoroscopic views into the epidural space. Proper needle position was confirmed under AP, oblique, and lateral fluoroscopic views.  Negative aspiration for blood or CSF was confirmed. 1 mL of contrast was injected, which revealed spread into the epidural space.  Then a combination of 5 mg of dexamethasone with 1 mL of 0.25% bupivacaine was easily injected.   There was no pain on injection. The needle was removed intact and bleeding was nil.  The same procedure was repeated in identical fashion on the right side at L4.  Sterile bandages were applied. The patient was taken to the recovery room for further observation in stable condition. The patient was then discharged home with no complications.

## 2025-02-12 NOTE — PLAN OF CARE
Discharge instructions given. Denies pain at this time. All criteria met and appropriate for discharge home with .

## 2025-02-25 NOTE — PROGRESS NOTES
Pain Management Clinic    Subjective:     Chief Complaint: Leg Pain (Patient states that she only has lower back pain when doing house work /Patient states that the injection helped 60%/Patient having leg pain when doing house work /Here today for p/o tfesi bilateral 2/12/25)    Referred by: No ref. provider found     History of Present Illness: Corrine French is a 74 y.o. female presents today as a postop follow-up for pain after completing bilateral L4 transforaminal epidural steroid injection on 02/12/2025.  Overall patient was very satisfied with her pain relief she reports it is 60% or sometimes a little higher pain relief since this epidural steroid injection.  She would like to focus on strength training in maintaining functionality with her core exercises.  She was agreeable to go back to physical therapy and specifically asked them to show her what is safe with strength training and core exercises.  Not need any refills of any pain medications and states overall she is extremely satisfied with her pain relief and reports is not severe at all she can do a lot more housework chores without feeling like she needs to cry or stop and sit down or lay down.  Patient was interested in following up with Dr. Ruiz  in three-month in the clinic to see how her pain relief is at that time.        History of Present Illness on 01/05/2025 revealed the following:   : Corrine French is a 74 y.o. female presents today as a three-week follow-up of MRI lumbar spine and to go over results and plan of care.  Not findings were noted at L4-L5 with the following:L4-5: Anterolisthesis of L4 on L5 with associated disc osteophyte complex and facet disease all contribute to severe central canal stenosis with severe bilateral neural foraminal and lateral recess narrowing..  Patient relayed she received greater than 80% relief of pain especially with egress sitting from lying down and standing up after receiving her bilateral L3  epidural steroid injection in November of 2024.  She is satisfied with that pain however she still has notable pain with prolonged standing and she feels the pain radiating down both sides of her legs in his worse when sitting to her legs.  Initially she reported the epidural steroid injection to bilateral L3 in November only helped her for a week however there was a delayed response and she felt more ease egress sitting out of bed and has enjoyed that.  Now she has more excruciating pain with standing too long with the pain that will radiate down the lateral knee and lateral legs in his worse with prolonged sitting.  These activities will elevate her pain score to a 8/10 sometimes a 9/10 on the NRS.  Her pain score today is 3/10.  Her primary pain is the legs at this point and pain with problems egress sitting from sitting to standing.  Her legs feel a little heavy as well.  She would like to proceed with a bilateral L4 epidural steroid injection.  She has completed physical therapy for months with no sustainable pain relief.  She also takes  gabapentin 600 mg at night to sleep and tries lower doses of 300 mg 1 to maybe 2 times a day with minimal relief of pain during the day however she is able to have more restorative sleep with the higher dose of gabapentin at night.  She also has noticed when she is resuming her chores especially those that require twisting at the waist standing longer periods of time and walking longer.  This will elevate her pain score to a 8/10 on the NRS.  Patient also has pertinent past medical history of rheumatoid arthritis and takes methotrexate.  She would like to proceed with bilateral L4 epidural steroid injections to see if this will provide more sustainable pain relief.           Pertinent PMH:  Right breast cancer, osteoporosis, rheumatoid arthritis, bilateral sciatica, hyperlipidemia  Pertinent PSH:  Bilateral breast lumpectomy, no spinal surgeries, pacemaker, defibrillator or  "spinal cord stimulator       Visit Vitals  BP (!) 152/72 (BP Location: Right arm, Patient Position: Sitting)   Pulse 86   Ht 5' 2" (1.575 m)   Wt 69.6 kg (153 lb 7 oz)   SpO2 96%   BMI 28.06 kg/m²      Vitals:    02/26/25 0955   PainSc:   3     Pain Disability Index (PDI): 27       Interventional Pain History  02/12/2025:  Bilateral L4 TFESI  11/14/2024: Bilateral L3 TFESI       ROS: Low back and leg pain       EMG/NCV results 2024  (lower extremities):    Results showed chronic bilateral L3 +L4 radiculopathy without denervation.    MRI lumbar spine 2024:  FINDINGS: There is no acute fracture. Severe degenerative disc space narrowing with degenerative endplate edema at L4-5 with grade 1 anterolisthesis of L4 on L5. Bilateral pars defects. Disc space narrowing at L1-2 is similar. The prevertebral and posterior paraspinal soft tissues are normal. Images of the abdomen are within normal limits. The visualized sacrum is unremarkable. The conus medullaris terminates normally. No cord signal abnormality.    L1-2: Disc osteophyte complex with facet disease contributes to mild bilateral neural foraminal narrowing.    L2-3: Annular disc bulging with ligamentum flavum thickening and facet disease contributes to mild right neural foraminal narrowing.    L3-4: Annular disc bulging with ligamentum flavum thickening and facet disease contributes to mild-to-moderate bilateral neural foraminal narrowing with bilateral lateral recess encroachment.    L4-5: Anterolisthesis of L4 on L5 with associated disc osteophyte complex and facet disease all contribute to severe central canal stenosis with severe bilateral neural foraminal and lateral recess narrowing.    L5-S1: Disc osteophyte complex with facet disease contributes to mild bilateral neural foraminal narrowing.             Objective:    Physical Exam  General: Well developed; normal weight A&O x 3; No anxiety/depression; NAD  Mental Status: Oriented to person, palce and time. " Displays appropriate mood & affect.  Head: Norm cephalic and atraumatic  Neck:  No cervical paraspinal banding.  Full range of motion with lateral turning and cervical flexion +extension.  Eyes: normal conjunctiva, normal lids, normal pupils  ENT and mouth: normal external ear, nose, and no lesions noted on the lips.  Respiratory: Symmetrical, Unlabored. No dyspnea  CV: normal rhythm and rate. No peripheral edema.   Abdomen: Non-distended     Extremities:  Gen: No cyanosis or tenderness to palpation bilateral upper and lower extremities  Skin: Warm, pink, dry, no rashes, no lesions on the lumbar spine  Strength: 5/5 motor strength bilateral upper and lower extremities  ROM: Full ROM in bilateral knees and ankles without pain or instability.     Neuro:  Gait: no altalgic lean, normal toe and heel raise. Independent ambulator.  DTR's: 2+ in bilateral patellar,   Sensory: Intact to light touch bilateral  upper and lower extremities     Spine: Normal lordosis. No scoliosis  L-spine ROM: Full ROM to flexion, extension, bilateral rotation,   Straight Leg Raise:  Negative bilateral  SI Joint: No tenderness to palpation bilaterally.           Assessment:     Corrine French is a 74 y.o. female presents today as a postop follow-up for pain after completing bilateral L4 transforaminal epidural steroid injection on 02/12/2025.  Overall patient was very satisfied with her pain relief she reports it is 60% or sometimes a little higher pain relief since this epidural steroid injection.  She would like to focus on strength training in maintaining functionality with her core exercises.  She was agreeable to go back to physical therapy and specifically asked them to show her what is safe with strength training and core exercises.  Not need any refills of any pain medications and states overall she is extremely satisfied with her pain relief and reports is not severe at all she can do a lot more housework chores without feeling like  "she needs to cry or stop and sit down or lay down.  Patient was interested in following up with Dr. Ruiz  in three-month in the clinic to see how her pain relief is at that time.      Plan of care:   Request  Consult sent physical therapy [facility] [address] to work with the patient for strength training exercises to her lower extremities and core for 6 weeks.  Patient will call if she is interested in a follow-up.  Encounter Diagnoses   Name Primary?    Lumbar disc displacement without myelopathy Yes    Lumbar radiculopathy     Chronic back pain greater than 3 months duration          Plan:       Corrine Mohr" was seen today for leg pain.    Diagnoses and all orders for this visit:    Lumbar disc displacement without myelopathy    Lumbar radiculopathy    Chronic back pain greater than 3 months duration           Past Medical History:   Diagnosis Date    Breast cancer in female     Rt breast    Hyperlipidemia     Osteoporosis     Rheumatoid arthritis, unspecified     Sciatica  BILATERAl     SEASONAL ALLERGIES        Past Surgical History:   Procedure Laterality Date    CHOLECYSTECTOMY      ENDOSCOPIC RELEASE OF BOTH CARPAL TUNNELS Bilateral     HEMORRHOID SURGERY N/A     HYSTERECTOMY      LUMPECTOMY, BREAST Right     LUMPECTOMY, BREAST Left     TRANSFORAMINAL EPIDURAL INJECTION OF STEROID Bilateral 11/14/2024    Procedure: INJECTION, STEROID, EPIDURAL, TRANSFORAMINAL APPROACH // Bilateral L3;  Surgeon: Nina Ruiz MD;  [address];  Service: Pain Management;  Laterality: Bilateral;  TFESI Jermaine L3    TRANSFORAMINAL EPIDURAL INJECTION OF STEROID Bilateral 2/12/2025    Procedure: INJECTION, STEROID, EPIDURAL, TRANSFORAMINAL APPROACH / TFESI Bilateral L4;  Surgeon: Nina Ruiz MD;  Location: [facility] OR;  Service: Pain Management;  Laterality: Bilateral;  BILATERAL TFESI L4       Family History   Problem Relation Name Age of Onset    Breast cancer Mother      Diabetes Mother      " Hypertension Mother      Stroke Father      Hypertension Father         Social History     Socioeconomic History    Marital status:    Tobacco Use    Smoking status: Former     Types: Cigarettes    Smokeless tobacco: Never   Substance and Sexual Activity    Alcohol use: Yes     Alcohol/week: 28.0 standard drinks of alcohol     Types: 14 Glasses of wine, 14 Drinks containing 0.5 oz of alcohol per week     Comment: wine and liquor 2 drinks/day    Drug use: Never    Sexual activity: Yes       Current Medications[1]    Review of patient's allergies indicates:   Allergen Reactions    Codeine Itching, Rash and Other (See Comments)     Other Reaction(s): C/O: itching    Hydrocodone Itching and Rash    Oxycodone-acetaminophen                   [1]   Current Outpatient Medications   Medication Sig Dispense Refill    albuterol (PROVENTIL/VENTOLIN HFA) 90 mcg/actuation inhaler Inhale 2 puffs into the lungs.      alendronate (FOSAMAX) 70 MG tablet Take 70 mg by mouth every 7 days.      ascorbic acid, vitamin C, (VITAMIN C) 1000 MG tablet Take 1 tablet by mouth every morning.      aspirin 81 MG Chew Take 1 tablet by mouth every morning.      biotin 5 mg Cap 1 capsule once daily.      busPIRone (BUSPAR) 5 MG Tab Take 5 mg by mouth.      calcium-vitamin D tablet 600 mg-200 units 1 tablet with a meal Orally twice a day      celecoxib (CELEBREX) 200 MG capsule Take 200 mg by mouth.      fluticasone propionate (FLONASE) 50 mcg/actuation nasal spray 1 spray by Nasal route daily as needed.      folic acid (FOLVITE) 1 MG tablet Take 1,000 mcg by mouth.      letrozole (FEMARA) 2.5 mg Tab Take 2.5 mg by mouth.      LUMIGAN 0.01 % Drop Place into both eyes.      methotrexate 2.5 MG Tab Take 10 mg by mouth every 7 days.      montelukast (SINGULAIR) 10 mg tablet Take 1 tablet by mouth every evening.      multivit-minerals/folic acid (ADULT ONE DAILY MULTIVITAMIN ORAL) once daily.      omeprazole (PRILOSEC) 40 MG capsule Take 40 mg by  mouth.      simvastatin (ZOCOR) 40 MG tablet Take 1 tablet by mouth every evening.      tiZANidine (ZANAFLEX) 4 MG tablet Take 4 mg by mouth every evening.      fluticasone-salmeterol diskus inhaler 100-50 mcg Inhale 1 puff into the lungs.       No current facility-administered medications for this visit.

## 2025-02-26 ENCOUNTER — OFFICE VISIT (OUTPATIENT)
Facility: CLINIC | Age: 75
End: 2025-02-26
Payer: MEDICARE

## 2025-02-26 VITALS
HEIGHT: 62 IN | DIASTOLIC BLOOD PRESSURE: 72 MMHG | SYSTOLIC BLOOD PRESSURE: 152 MMHG | BODY MASS INDEX: 28.24 KG/M2 | WEIGHT: 153.44 LBS | OXYGEN SATURATION: 96 % | HEART RATE: 86 BPM

## 2025-02-26 DIAGNOSIS — M54.9 CHRONIC BACK PAIN GREATER THAN 3 MONTHS DURATION: ICD-10-CM

## 2025-02-26 DIAGNOSIS — M54.16 LUMBAR RADICULOPATHY: ICD-10-CM

## 2025-02-26 DIAGNOSIS — G89.29 CHRONIC BACK PAIN GREATER THAN 3 MONTHS DURATION: ICD-10-CM

## 2025-02-26 DIAGNOSIS — M51.26 LUMBAR DISC DISPLACEMENT WITHOUT MYELOPATHY: Primary | ICD-10-CM

## 2025-02-26 PROCEDURE — 1101F PT FALLS ASSESS-DOCD LE1/YR: CPT | Mod: CPTII,,, | Performed by: NURSE PRACTITIONER

## 2025-02-26 PROCEDURE — 3288F FALL RISK ASSESSMENT DOCD: CPT | Mod: CPTII,,, | Performed by: NURSE PRACTITIONER

## 2025-02-26 PROCEDURE — 3078F DIAST BP <80 MM HG: CPT | Mod: CPTII,,, | Performed by: NURSE PRACTITIONER

## 2025-02-26 PROCEDURE — 3077F SYST BP >= 140 MM HG: CPT | Mod: CPTII,,, | Performed by: NURSE PRACTITIONER

## 2025-02-26 PROCEDURE — 1159F MED LIST DOCD IN RCRD: CPT | Mod: CPTII,,, | Performed by: NURSE PRACTITIONER

## 2025-02-26 PROCEDURE — 1125F AMNT PAIN NOTED PAIN PRSNT: CPT | Mod: CPTII,,, | Performed by: NURSE PRACTITIONER

## 2025-02-26 PROCEDURE — 99213 OFFICE O/P EST LOW 20 MIN: CPT | Mod: ,,, | Performed by: NURSE PRACTITIONER

## 2025-05-27 ENCOUNTER — OFFICE VISIT (OUTPATIENT)
Facility: CLINIC | Age: 75
End: 2025-05-27
Payer: MEDICARE

## 2025-05-27 VITALS
HEIGHT: 62 IN | BODY MASS INDEX: 27.6 KG/M2 | HEART RATE: 87 BPM | SYSTOLIC BLOOD PRESSURE: 126 MMHG | WEIGHT: 150 LBS | DIASTOLIC BLOOD PRESSURE: 75 MMHG

## 2025-05-27 DIAGNOSIS — M54.16 LUMBAR RADICULITIS: ICD-10-CM

## 2025-05-27 DIAGNOSIS — G89.29 CHRONIC BACK PAIN GREATER THAN 3 MONTHS DURATION: Primary | ICD-10-CM

## 2025-05-27 DIAGNOSIS — M51.362 DEGENERATION OF INTERVERTEBRAL DISC OF LUMBAR REGION WITH DISCOGENIC BACK PAIN AND LOWER EXTREMITY PAIN: ICD-10-CM

## 2025-05-27 DIAGNOSIS — M54.9 CHRONIC BACK PAIN GREATER THAN 3 MONTHS DURATION: Primary | ICD-10-CM

## 2025-05-27 PROCEDURE — 1159F MED LIST DOCD IN RCRD: CPT | Mod: CPTII,,, | Performed by: ANESTHESIOLOGY

## 2025-05-27 PROCEDURE — 1101F PT FALLS ASSESS-DOCD LE1/YR: CPT | Mod: CPTII,,, | Performed by: ANESTHESIOLOGY

## 2025-05-27 PROCEDURE — 99213 OFFICE O/P EST LOW 20 MIN: CPT | Mod: ,,, | Performed by: ANESTHESIOLOGY

## 2025-05-27 PROCEDURE — 1160F RVW MEDS BY RX/DR IN RCRD: CPT | Mod: CPTII,,, | Performed by: ANESTHESIOLOGY

## 2025-05-27 PROCEDURE — 3288F FALL RISK ASSESSMENT DOCD: CPT | Mod: CPTII,,, | Performed by: ANESTHESIOLOGY

## 2025-05-27 PROCEDURE — 3078F DIAST BP <80 MM HG: CPT | Mod: CPTII,,, | Performed by: ANESTHESIOLOGY

## 2025-05-27 PROCEDURE — 1125F AMNT PAIN NOTED PAIN PRSNT: CPT | Mod: CPTII,,, | Performed by: ANESTHESIOLOGY

## 2025-05-27 PROCEDURE — 3074F SYST BP LT 130 MM HG: CPT | Mod: CPTII,,, | Performed by: ANESTHESIOLOGY

## 2025-05-27 RX ORDER — HYDROXYCHLOROQUINE SULFATE 200 MG/1
TABLET, FILM COATED ORAL
COMMUNITY
Start: 2025-03-12

## 2025-05-27 NOTE — PROGRESS NOTES
"  Pain Management Clinic    Subjective:     Chief Complaint: Follow-up (3 month f/u for Lumbar pain  w/Dr Joseph Patel C/O pain level 2, not taking pain meds)    Referred by: No ref. provider found     History of Present Illness:   Corrine French is a 74 y.o. female who returns to clinic today for follow up of her chronic low back pain.  She underwent bilateral L4 transforaminal epidural steroid injections in February this year.  She states that she is still getting good relief at this time, at least 60%.  She currently rates her pain as 2/10 on the NRS.  She states the only time she really feels pain is radiating into the right lower extremity at night.  She did get some relief with physical therapy as well and is doing the stretches and exercises at home now.  She takes ibuprofen on rare occasions for flare-ups.              Visit Vitals  /75 (BP Location: Right arm, Patient Position: Sitting)   Pulse 87   Ht 5' 2" (1.575 m)   Wt 68 kg (150 lb)   BMI 27.44 kg/m²      Vitals:    05/27/25 1335   PainSc:   2     Pain Disability Index (PDI): 16       Interventional Pain History  02/12/2025:  Bilateral L4 TFESI  11/14/2024: Bilateral L3 TFESI       Review of Systems   All other systems reviewed and are negative.  : Low back and leg pain       EMG/NCV results 2024  (lower extremities):    Results showed chronic bilateral L3 +L4 radiculopathy without denervation.    MRI lumbar spine 2024:  FINDINGS: There is no acute fracture. Severe degenerative disc space narrowing with degenerative endplate edema at L4-5 with grade 1 anterolisthesis of L4 on L5. Bilateral pars defects. Disc space narrowing at L1-2 is similar. The prevertebral and posterior paraspinal soft tissues are normal. Images of the abdomen are within normal limits. The visualized sacrum is unremarkable. The conus medullaris terminates normally. No cord signal abnormality.    L1-2: Disc osteophyte complex with facet disease contributes to mild " bilateral neural foraminal narrowing.    L2-3: Annular disc bulging with ligamentum flavum thickening and facet disease contributes to mild right neural foraminal narrowing.    L3-4: Annular disc bulging with ligamentum flavum thickening and facet disease contributes to mild-to-moderate bilateral neural foraminal narrowing with bilateral lateral recess encroachment.    L4-5: Anterolisthesis of L4 on L5 with associated disc osteophyte complex and facet disease all contribute to severe central canal stenosis with severe bilateral neural foraminal and lateral recess narrowing.    L5-S1: Disc osteophyte complex with facet disease contributes to mild bilateral neural foraminal narrowing.             Objective:    Physical Exam  General: Well developed; normal weight A&O x 3; No anxiety/depression; NAD  Mental Status: Oriented to person, palce and time. Displays appropriate mood & affect.  Head: Norm cephalic and atraumatic  Neck:  No cervical paraspinal banding.  Full range of motion with lateral turning and cervical flexion +extension.  Eyes: normal conjunctiva, normal lids, normal pupils  ENT and mouth: normal external ear, nose, and no lesions noted on the lips.  Respiratory: Symmetrical, Unlabored. No dyspnea  CV: normal rhythm and rate. No peripheral edema.   Abdomen: Non-distended     Extremities:  Gen: No cyanosis or tenderness to palpation bilateral upper and lower extremities  Skin: Warm, pink, dry, no rashes, no lesions on the lumbar spine  Strength: 5/5 motor strength bilateral upper and lower extremities  ROM: Full ROM in bilateral knees and ankles without pain or instability.     Neuro:  Gait: no altalgic lean, normal toe and heel raise. Independent ambulator.  DTR's: 2+ in bilateral patellar,   Sensory: Intact to light touch bilateral  upper and lower extremities     Spine: Normal lordosis. No scoliosis  L-spine ROM: Full ROM to flexion, extension, bilateral rotation,   Straight Leg Raise:  Negative  "bilateral  SI Joint: No tenderness to palpation bilaterally.           Assessment:     [facility] therapy [facility] [address] to work with the patient for strength training exercises to her lower extremities and core for 6 weeks.  Patient will call if she is interested in a follow-up.  Encounter Diagnoses   Name Primary?    Chronic back pain greater than 3 months duration Yes    Lumbar radiculitis     Degeneration of intervertebral disc of lumbar region with discogenic back pain and lower extremity pain              Plan:       Corrine Mohr" was seen today for follow-up.    Diagnoses and all orders for this visit:    Chronic back pain greater than 3 months duration    Lumbar radiculitis    Degeneration of intervertebral disc of lumbar region with discogenic back pain and lower extremity pain               Past Medical History:   Diagnosis Date    Breast cancer in female     Rt breast    Hyperlipidemia     Osteoporosis     Rheumatoid arthritis, unspecified     Sciatica  BILATERAl     SEASONAL ALLERGIES        Past Surgical History:   Procedure Laterality Date    CHOLECYSTECTOMY      ENDOSCOPIC RELEASE OF BOTH CARPAL TUNNELS Bilateral     HEMORRHOID SURGERY N/A     HYSTERECTOMY      LUMPECTOMY, BREAST Right     LUMPECTOMY, BREAST Left     TRANSFORAMINAL EPIDURAL INJECTION OF STEROID Bilateral 11/14/2024    Procedure: INJECTION, STEROID, EPIDURAL, TRANSFORAMINAL APPROACH // Bilateral L3;  Surgeon: Nina Ruiz MD;  Location: [address] OR;  Service: Pain Management;  Laterality: Bilateral;  TFESI Jermaine L3    TRANSFORAMINAL EPIDURAL INJECTION OF STEROID Bilateral 2/12/2025    Procedure: INJECTION, STEROID, EPIDURAL, TRANSFORAMINAL APPROACH / TFESI Bilateral L4;  Surgeon: Nina Ruiz MD;  Location: [facility] OR;  Service: Pain Management;  Laterality: Bilateral;  BILATERAL TFESI L4       Family History   Problem Relation Name Age of Onset    Breast cancer Mother      Diabetes Mother      Hypertension Mother      " Stroke Father      Hypertension Father         Social History     Socioeconomic History    Marital status:    Tobacco Use    Smoking status: Former     Types: Cigarettes    Smokeless tobacco: Never   Substance and Sexual Activity    Alcohol use: Yes     Alcohol/week: 28.0 standard drinks of alcohol     Types: 14 Glasses of wine, 14 Drinks containing 0.5 oz of alcohol per week     Comment: wine and liquor 2 drinks/day    Drug use: Never    Sexual activity: Yes       Current Medications[1]    Review of patient's allergies indicates:   Allergen Reactions    Codeine Itching, Rash and Other (See Comments)     Other Reaction(s): C/O: itching    Hydrocodone Itching and Rash     Other Reaction(s): rash, itching    Oxycodone-acetaminophen                     [1]   Current Outpatient Medications   Medication Sig Dispense Refill    albuterol (PROVENTIL/VENTOLIN HFA) 90 mcg/actuation inhaler Inhale 2 puffs into the lungs.      alendronate (FOSAMAX) 70 MG tablet Take 70 mg by mouth every 7 days.      ascorbic acid, vitamin C, (VITAMIN C) 1000 MG tablet Take 1 tablet by mouth every morning.      aspirin 81 MG Chew Take 1 tablet by mouth every morning.      biotin 5 mg Cap 1 capsule once daily.      busPIRone (BUSPAR) 5 MG Tab Take 5 mg by mouth.      calcium-vitamin D tablet 600 mg-200 units 1 tablet with a meal Orally twice a day      celecoxib (CELEBREX) 200 MG capsule Take 200 mg by mouth.      fluticasone propionate (FLONASE) 50 mcg/actuation nasal spray 1 spray by Nasal route daily as needed.      folic acid (FOLVITE) 1 MG tablet Take 1,000 mcg by mouth.      hydroxychloroquine (PLAQUENIL) 200 mg tablet 1 tab Orally twice a day, eye check annually for 90 days      letrozole (FEMARA) 2.5 mg Tab Take 2.5 mg by mouth.      LUMIGAN 0.01 % Drop Place into both eyes.      methotrexate 2.5 MG Tab Take 10 mg by mouth every 7 days.      montelukast (SINGULAIR) 10 mg tablet Take 1 tablet by mouth every evening.       multivit-minerals/folic acid (ADULT ONE DAILY MULTIVITAMIN ORAL) once daily.      omeprazole (PRILOSEC) 40 MG capsule Take 40 mg by mouth.      simvastatin (ZOCOR) 40 MG tablet Take 1 tablet by mouth every evening.      tiZANidine (ZANAFLEX) 4 MG tablet Take 4 mg by mouth every evening.      fluticasone-salmeterol diskus inhaler 100-50 mcg Inhale 1 puff into the lungs.       No current facility-administered medications for this visit.

## (undated) DEVICE — Device

## (undated) DEVICE — NDL HYPO REG 25G X 1 1/2

## (undated) DEVICE — DRAPE MEDIUM SHEET 40X70IN

## (undated) DEVICE — SYR 3ML LL 18GA 1.5IN

## (undated) DEVICE — CHLORAPREP 10.5 ML APPLICATOR

## (undated) DEVICE — ADAPTER DUAL NSL LUER M-M 7FT

## (undated) DEVICE — NDL FLTR 5MCRN BLNT TIP 18GX1

## (undated) DEVICE — CONTRAST ISOVUE M 200 20ML VIL

## (undated) DEVICE — NDL SYR 10ML 18X1.5 LL BLUNT

## (undated) DEVICE — GLOVE SIGNATURE MICRO LTX 6.5

## (undated) DEVICE — SET SMARTSITE EXT SMALLBORE NF

## (undated) DEVICE — DRAPE UTILITY W/ TAPE 20X30IN